# Patient Record
Sex: FEMALE | Race: WHITE | NOT HISPANIC OR LATINO
[De-identification: names, ages, dates, MRNs, and addresses within clinical notes are randomized per-mention and may not be internally consistent; named-entity substitution may affect disease eponyms.]

---

## 2018-11-07 ENCOUNTER — RESULT REVIEW (OUTPATIENT)
Age: 28
End: 2018-11-07

## 2019-11-22 ENCOUNTER — RESULT REVIEW (OUTPATIENT)
Age: 29
End: 2019-11-22

## 2020-12-13 ENCOUNTER — RESULT REVIEW (OUTPATIENT)
Age: 30
End: 2020-12-13

## 2022-05-06 ENCOUNTER — OUTPATIENT (OUTPATIENT)
Dept: OUTPATIENT SERVICES | Facility: HOSPITAL | Age: 32
LOS: 1 days | End: 2022-05-06
Payer: COMMERCIAL

## 2022-05-06 VITALS
SYSTOLIC BLOOD PRESSURE: 122 MMHG | RESPIRATION RATE: 20 BRPM | DIASTOLIC BLOOD PRESSURE: 80 MMHG | HEART RATE: 82 BPM | WEIGHT: 167.77 LBS | TEMPERATURE: 98 F | OXYGEN SATURATION: 99 % | HEIGHT: 63 IN

## 2022-05-06 DIAGNOSIS — Z29.9 ENCOUNTER FOR PROPHYLACTIC MEASURES, UNSPECIFIED: ICD-10-CM

## 2022-05-06 DIAGNOSIS — Z01.818 ENCOUNTER FOR OTHER PREPROCEDURAL EXAMINATION: ICD-10-CM

## 2022-05-06 LAB
BLD GP AB SCN SERPL QL: NEGATIVE — SIGNIFICANT CHANGE UP
HCT VFR BLD CALC: 40.2 % — SIGNIFICANT CHANGE UP (ref 34.5–45)
HGB BLD-MCNC: 13.8 G/DL — SIGNIFICANT CHANGE UP (ref 11.5–15.5)
MCHC RBC-ENTMCNC: 32.2 PG — SIGNIFICANT CHANGE UP (ref 27–34)
MCHC RBC-ENTMCNC: 34.3 GM/DL — SIGNIFICANT CHANGE UP (ref 32–36)
MCV RBC AUTO: 93.9 FL — SIGNIFICANT CHANGE UP (ref 80–100)
NRBC # BLD: 0 /100 WBCS — SIGNIFICANT CHANGE UP (ref 0–0)
PLATELET # BLD AUTO: 127 K/UL — LOW (ref 150–400)
RBC # BLD: 4.28 M/UL — SIGNIFICANT CHANGE UP (ref 3.8–5.2)
RBC # FLD: 13.2 % — SIGNIFICANT CHANGE UP (ref 10.3–14.5)
RH IG SCN BLD-IMP: POSITIVE — SIGNIFICANT CHANGE UP
WBC # BLD: 10.44 K/UL — SIGNIFICANT CHANGE UP (ref 3.8–10.5)
WBC # FLD AUTO: 10.44 K/UL — SIGNIFICANT CHANGE UP (ref 3.8–10.5)

## 2022-05-06 PROCEDURE — 85027 COMPLETE CBC AUTOMATED: CPT

## 2022-05-06 PROCEDURE — 86900 BLOOD TYPING SEROLOGIC ABO: CPT

## 2022-05-06 PROCEDURE — 86850 RBC ANTIBODY SCREEN: CPT

## 2022-05-06 PROCEDURE — 86901 BLOOD TYPING SEROLOGIC RH(D): CPT

## 2022-05-06 PROCEDURE — G0463: CPT

## 2022-05-06 RX ORDER — CEFAZOLIN SODIUM 1 G
2000 VIAL (EA) INJECTION ONCE
Refills: 0 | Status: COMPLETED | OUTPATIENT
Start: 2022-05-10 | End: 2022-05-10

## 2022-05-06 RX ORDER — FAMOTIDINE 10 MG/ML
20 INJECTION INTRAVENOUS ONCE
Refills: 0 | Status: COMPLETED | OUTPATIENT
Start: 2022-05-10 | End: 2022-05-10

## 2022-05-06 RX ORDER — SODIUM CHLORIDE 9 MG/ML
1000 INJECTION, SOLUTION INTRAVENOUS ONCE
Refills: 0 | Status: COMPLETED | OUTPATIENT
Start: 2022-05-10 | End: 2022-05-10

## 2022-05-06 RX ORDER — OXYTOCIN 10 UNIT/ML
333.33 VIAL (ML) INJECTION
Qty: 20 | Refills: 0 | Status: DISCONTINUED | OUTPATIENT
Start: 2022-05-10 | End: 2022-05-12

## 2022-05-06 RX ORDER — CITRIC ACID/SODIUM CITRATE 300-500 MG
15 SOLUTION, ORAL ORAL ONCE
Refills: 0 | Status: COMPLETED | OUTPATIENT
Start: 2022-05-10 | End: 2022-05-10

## 2022-05-06 RX ORDER — CHLORHEXIDINE GLUCONATE 213 G/1000ML
1 SOLUTION TOPICAL ONCE
Refills: 0 | Status: COMPLETED | OUTPATIENT
Start: 2022-05-10 | End: 2022-05-10

## 2022-05-06 RX ORDER — SODIUM CHLORIDE 9 MG/ML
1000 INJECTION, SOLUTION INTRAVENOUS
Refills: 0 | Status: DISCONTINUED | OUTPATIENT
Start: 2022-05-10 | End: 2022-05-10

## 2022-05-06 NOTE — OB PST NOTE - HISTORY OF PRESENT ILLNESS
31 yr old female  31 yr old female ,  , with Breech presentation, Coming in for Primary , EDC 2022    Denies Recent travel, Exposure or Covid symptoms  Covid test- 2022

## 2022-05-06 NOTE — OB PST NOTE - ASSESSMENT
PATRICEI VTE 2.0 SCORE [CLOT updated 2019]    AGE RELATED RISK FACTORS                                                       MOBILITY RELATED FACTORS  [ ] Age 41-60 years                                            (1 Point)                    [ ] Bed rest                                                        (1 Point)  [ ] Age: 61-74 years                                           (2 Points)                  [ ] Plaster cast                                                   (2 Points)  [ ] Age= 75 years                                              (3 Points)                    [ ] Bed bound for more than 72 hours                 (2 Points)    DISEASE RELATED RISK FACTORS                                               GENDER SPECIFIC FACTORS  [ ] Edema in the lower extremities                       (1 Point)              [ ] Pregnancy                                                     (1 Point)  [ ] Varicose veins                                               (1 Point)                     [ ] Post-partum < 6 weeks                                   (1 Point)             [ ] BMI > 25 Kg/m2                                            (1 Point)                     [ ] Hormonal therapy  or oral contraception          (1 Point)                 [ ] Sepsis (in the previous month)                        (1 Point)               [ ] History of pregnancy complications                 (1 point)  [ ] Pneumonia or serious lung disease                                               [ ] Unexplained or recurrent                     (1 Point)           (in the previous month)                               (1 Point)  [ ] Abnormal pulmonary function test                     (1 Point)                 SURGERY RELATED RISK FACTORS  [ ] Acute myocardial infarction                              (1 Point)               [ ]  Section                                             (1 Point)  [ ] Congestive heart failure (in the previous month)  (1 Point)      [ ] Minor surgery                                                  (1 Point)   [ ] Inflammatory bowel disease                             (1 Point)               [ ] Arthroscopic surgery                                        (2 Points)  [ ] Central venous access                                      (2 Points)                [ ] General surgery lasting more than 45 minutes (2 points)  [ ] Malignancy- Present or previous                   (2 Points)                [ ] Elective arthroplasty                                         (5 points)    [ ] Stroke (in the previous month)                          (5 Points)                                                                                                                                                           HEMATOLOGY RELATED FACTORS                                                 TRAUMA RELATED RISK FACTORS  [ ] Prior episodes of VTE                                     (3 Points)                [ ] Fracture of the hip, pelvis, or leg                       (5 Points)  [ ] Positive family history for VTE                         (3 Points)             [ ] Acute spinal cord injury (in the previous month)  (5 Points)  [ ] Prothrombin 47550 A                                     (3 Points)               [ ] Paralysis  (less than 1 month)                             (5 Points)  [ ] Factor V Leiden                                             (3 Points)                  [ ] Multiple Trauma within 1 month                        (5 Points)  [ ] Lupus anticoagulants                                     (3 Points)                                                           [ ] Anticardiolipin antibodies                               (3 Points)                                                       [ ] High homocysteine in the blood                      (3 Points)                                             [ ] Other congenital or acquired thrombophilia      (3 Points)                                                [ ] Heparin induced thrombocytopenia                  (3 Points)                                     Total Score [          ] PATRIECI VTE 2.0 SCORE [CLOT updated 2019]    AGE RELATED RISK FACTORS                                                       MOBILITY RELATED FACTORS  [ ] Age 41-60 years                                            (1 Point)                    [ ] Bed rest                                                        (1 Point)  [ ] Age: 61-74 years                                           (2 Points)                  [ ] Plaster cast                                                   (2 Points)  [ ] Age= 75 years                                              (3 Points)                    [ ] Bed bound for more than 72 hours                 (2 Points)    DISEASE RELATED RISK FACTORS                                               GENDER SPECIFIC FACTORS  [ ] Edema in the lower extremities                       (1 Point)              [x ] Pregnancy                                                     (1 Point)  [ ] Varicose veins                                               (1 Point)                     [ ] Post-partum < 6 weeks                                   (1 Point)             [x ] BMI > 25 Kg/m2                                            (1 Point)                     [ ] Hormonal therapy  or oral contraception          (1 Point)                 [ ] Sepsis (in the previous month)                        (1 Point)               [ ] History of pregnancy complications                 (1 point)  [ ] Pneumonia or serious lung disease                                               [ ] Unexplained or recurrent                     (1 Point)           (in the previous month)                               (1 Point)  [ ] Abnormal pulmonary function test                     (1 Point)                 SURGERY RELATED RISK FACTORS  [ ] Acute myocardial infarction                              (1 Point)               [x ]  Section                                             (1 Point)  [ ] Congestive heart failure (in the previous month)  (1 Point)      [ ] Minor surgery                                                  (1 Point)   [ ] Inflammatory bowel disease                             (1 Point)               [ ] Arthroscopic surgery                                        (2 Points)  [ ] Central venous access                                      (2 Points)                [ ] General surgery lasting more than 45 minutes (2 points)  [ ] Malignancy- Present or previous                   (2 Points)                [ ] Elective arthroplasty                                         (5 points)    [ ] Stroke (in the previous month)                          (5 Points)                                                                                                                                                           HEMATOLOGY RELATED FACTORS                                                 TRAUMA RELATED RISK FACTORS  [ ] Prior episodes of VTE                                     (3 Points)                [ ] Fracture of the hip, pelvis, or leg                       (5 Points)  [ ] Positive family history for VTE                         (3 Points)             [ ] Acute spinal cord injury (in the previous month)  (5 Points)  [ ] Prothrombin 74589 A                                     (3 Points)               [ ] Paralysis  (less than 1 month)                             (5 Points)  [ ] Factor V Leiden                                             (3 Points)                  [ ] Multiple Trauma within 1 month                        (5 Points)  [ ] Lupus anticoagulants                                     (3 Points)                                                           [ ] Anticardiolipin antibodies                               (3 Points)                                                       [ ] High homocysteine in the blood                      (3 Points)                                             [ ] Other congenital or acquired thrombophilia      (3 Points)                                                [ ] Heparin induced thrombocytopenia                  (3 Points)                                     Total Score [    3      ]

## 2022-05-06 NOTE — OB PST NOTE - FALL HARM RISK - UNIVERSAL INTERVENTIONS
Bed in lowest position, wheels locked, appropriate side rails in place/Call bell, personal items and telephone in reach/Instruct patient to call for assistance before getting out of bed or chair/Non-slip footwear when patient is out of bed/[place] to call system/Physically safe environment - no spills, clutter or unnecessary equipment/Purposeful Proactive Rounding/Room/bathroom lighting operational, light cord in reach

## 2022-05-08 ENCOUNTER — OUTPATIENT (OUTPATIENT)
Dept: OUTPATIENT SERVICES | Facility: HOSPITAL | Age: 32
LOS: 1 days | End: 2022-05-08
Payer: COMMERCIAL

## 2022-05-08 DIAGNOSIS — Z11.52 ENCOUNTER FOR SCREENING FOR COVID-19: ICD-10-CM

## 2022-05-08 LAB — SARS-COV-2 RNA SPEC QL NAA+PROBE: SIGNIFICANT CHANGE UP

## 2022-05-08 PROCEDURE — C9803: CPT

## 2022-05-08 PROCEDURE — U0003: CPT

## 2022-05-08 PROCEDURE — U0005: CPT

## 2022-05-09 ENCOUNTER — TRANSCRIPTION ENCOUNTER (OUTPATIENT)
Age: 32
End: 2022-05-09

## 2022-05-10 ENCOUNTER — INPATIENT (INPATIENT)
Facility: HOSPITAL | Age: 32
LOS: 1 days | Discharge: HOME | End: 2022-05-12
Attending: OBSTETRICS & GYNECOLOGY | Admitting: OBSTETRICS & GYNECOLOGY
Payer: COMMERCIAL

## 2022-05-10 VITALS — DIASTOLIC BLOOD PRESSURE: 94 MMHG | SYSTOLIC BLOOD PRESSURE: 136 MMHG | HEART RATE: 103 BPM

## 2022-05-10 LAB
BASOPHILS # BLD AUTO: 0.05 K/UL — SIGNIFICANT CHANGE UP (ref 0–0.2)
BASOPHILS NFR BLD AUTO: 0.5 % — SIGNIFICANT CHANGE UP (ref 0–2)
COVID-19 SPIKE DOMAIN AB INTERP: POSITIVE
COVID-19 SPIKE DOMAIN ANTIBODY RESULT: >250 U/ML — HIGH
EOSINOPHIL # BLD AUTO: 0.09 K/UL — SIGNIFICANT CHANGE UP (ref 0–0.5)
EOSINOPHIL NFR BLD AUTO: 0.9 % — SIGNIFICANT CHANGE UP (ref 0–6)
HCT VFR BLD CALC: 38 % — SIGNIFICANT CHANGE UP (ref 34.5–45)
HGB BLD-MCNC: 13.4 G/DL — SIGNIFICANT CHANGE UP (ref 11.5–15.5)
IMM GRANULOCYTES NFR BLD AUTO: 0.5 % — SIGNIFICANT CHANGE UP (ref 0–1.5)
LYMPHOCYTES # BLD AUTO: 2.65 K/UL — SIGNIFICANT CHANGE UP (ref 1–3.3)
LYMPHOCYTES # BLD AUTO: 26.8 % — SIGNIFICANT CHANGE UP (ref 13–44)
MCHC RBC-ENTMCNC: 32.7 PG — SIGNIFICANT CHANGE UP (ref 27–34)
MCHC RBC-ENTMCNC: 35.3 GM/DL — SIGNIFICANT CHANGE UP (ref 32–36)
MCV RBC AUTO: 92.7 FL — SIGNIFICANT CHANGE UP (ref 80–100)
MONOCYTES # BLD AUTO: 0.85 K/UL — SIGNIFICANT CHANGE UP (ref 0–0.9)
MONOCYTES NFR BLD AUTO: 8.6 % — SIGNIFICANT CHANGE UP (ref 2–14)
NEUTROPHILS # BLD AUTO: 6.19 K/UL — SIGNIFICANT CHANGE UP (ref 1.8–7.4)
NEUTROPHILS NFR BLD AUTO: 62.7 % — SIGNIFICANT CHANGE UP (ref 43–77)
NRBC # BLD: 0 /100 WBCS — SIGNIFICANT CHANGE UP (ref 0–0)
PLATELET # BLD AUTO: 110 K/UL — LOW (ref 150–400)
RBC # BLD: 4.1 M/UL — SIGNIFICANT CHANGE UP (ref 3.8–5.2)
RBC # FLD: 13.2 % — SIGNIFICANT CHANGE UP (ref 10.3–14.5)
SARS-COV-2 IGG+IGM SERPL QL IA: >250 U/ML — HIGH
SARS-COV-2 IGG+IGM SERPL QL IA: POSITIVE
T PALLIDUM AB TITR SER: NEGATIVE — SIGNIFICANT CHANGE UP
WBC # BLD: 9.88 K/UL — SIGNIFICANT CHANGE UP (ref 3.8–10.5)
WBC # FLD AUTO: 9.88 K/UL — SIGNIFICANT CHANGE UP (ref 3.8–10.5)

## 2022-05-10 DEVICE — INTERCEED 3 X 4": Type: IMPLANTABLE DEVICE | Status: FUNCTIONAL

## 2022-05-10 RX ORDER — NALBUPHINE HYDROCHLORIDE 10 MG/ML
2.5 INJECTION, SOLUTION INTRAMUSCULAR; INTRAVENOUS; SUBCUTANEOUS EVERY 6 HOURS
Refills: 0 | Status: DISCONTINUED | OUTPATIENT
Start: 2022-05-10 | End: 2022-05-11

## 2022-05-10 RX ORDER — KETOROLAC TROMETHAMINE 30 MG/ML
30 SYRINGE (ML) INJECTION EVERY 6 HOURS
Refills: 0 | Status: DISCONTINUED | OUTPATIENT
Start: 2022-05-10 | End: 2022-05-11

## 2022-05-10 RX ORDER — HEPARIN SODIUM 5000 [USP'U]/ML
5000 INJECTION INTRAVENOUS; SUBCUTANEOUS EVERY 12 HOURS
Refills: 0 | Status: DISCONTINUED | OUTPATIENT
Start: 2022-05-10 | End: 2022-05-12

## 2022-05-10 RX ORDER — ACETAMINOPHEN 500 MG
975 TABLET ORAL
Refills: 0 | Status: DISCONTINUED | OUTPATIENT
Start: 2022-05-10 | End: 2022-05-12

## 2022-05-10 RX ORDER — OXYTOCIN 10 UNIT/ML
333.33 VIAL (ML) INJECTION
Qty: 20 | Refills: 0 | Status: DISCONTINUED | OUTPATIENT
Start: 2022-05-10 | End: 2022-05-12

## 2022-05-10 RX ORDER — OXYCODONE HYDROCHLORIDE 5 MG/1
5 TABLET ORAL ONCE
Refills: 0 | Status: DISCONTINUED | OUTPATIENT
Start: 2022-05-10 | End: 2022-05-11

## 2022-05-10 RX ORDER — ONDANSETRON 8 MG/1
4 TABLET, FILM COATED ORAL EVERY 6 HOURS
Refills: 0 | Status: DISCONTINUED | OUTPATIENT
Start: 2022-05-10 | End: 2022-05-11

## 2022-05-10 RX ORDER — TETANUS TOXOID, REDUCED DIPHTHERIA TOXOID AND ACELLULAR PERTUSSIS VACCINE, ADSORBED 5; 2.5; 8; 8; 2.5 [IU]/.5ML; [IU]/.5ML; UG/.5ML; UG/.5ML; UG/.5ML
0.5 SUSPENSION INTRAMUSCULAR ONCE
Refills: 0 | Status: DISCONTINUED | OUTPATIENT
Start: 2022-05-10 | End: 2022-05-12

## 2022-05-10 RX ORDER — OXYCODONE HYDROCHLORIDE 5 MG/1
5 TABLET ORAL
Refills: 0 | Status: DISCONTINUED | OUTPATIENT
Start: 2022-05-10 | End: 2022-05-12

## 2022-05-10 RX ORDER — METOCLOPRAMIDE HCL 10 MG
10 TABLET ORAL ONCE
Refills: 0 | Status: COMPLETED | OUTPATIENT
Start: 2022-05-10 | End: 2022-05-10

## 2022-05-10 RX ORDER — DIPHENHYDRAMINE HCL 50 MG
25 CAPSULE ORAL EVERY 6 HOURS
Refills: 0 | Status: DISCONTINUED | OUTPATIENT
Start: 2022-05-10 | End: 2022-05-12

## 2022-05-10 RX ORDER — MORPHINE SULFATE 50 MG/1
0.1 CAPSULE, EXTENDED RELEASE ORAL ONCE
Refills: 0 | Status: DISCONTINUED | OUTPATIENT
Start: 2022-05-10 | End: 2022-05-11

## 2022-05-10 RX ORDER — SIMETHICONE 80 MG/1
80 TABLET, CHEWABLE ORAL EVERY 4 HOURS
Refills: 0 | Status: DISCONTINUED | OUTPATIENT
Start: 2022-05-10 | End: 2022-05-12

## 2022-05-10 RX ORDER — ACETAMINOPHEN 500 MG
975 TABLET ORAL
Refills: 0 | Status: DISCONTINUED | OUTPATIENT
Start: 2022-05-10 | End: 2022-05-11

## 2022-05-10 RX ORDER — NALOXONE HYDROCHLORIDE 4 MG/.1ML
0.1 SPRAY NASAL
Refills: 0 | Status: DISCONTINUED | OUTPATIENT
Start: 2022-05-10 | End: 2022-05-11

## 2022-05-10 RX ORDER — DEXAMETHASONE 0.5 MG/5ML
4 ELIXIR ORAL EVERY 6 HOURS
Refills: 0 | Status: DISCONTINUED | OUTPATIENT
Start: 2022-05-10 | End: 2022-05-11

## 2022-05-10 RX ORDER — OXYCODONE HYDROCHLORIDE 5 MG/1
5 TABLET ORAL ONCE
Refills: 0 | Status: DISCONTINUED | OUTPATIENT
Start: 2022-05-10 | End: 2022-05-12

## 2022-05-10 RX ORDER — SODIUM CHLORIDE 9 MG/ML
1000 INJECTION, SOLUTION INTRAVENOUS
Refills: 0 | Status: DISCONTINUED | OUTPATIENT
Start: 2022-05-10 | End: 2022-05-12

## 2022-05-10 RX ORDER — MAGNESIUM HYDROXIDE 400 MG/1
30 TABLET, CHEWABLE ORAL
Refills: 0 | Status: DISCONTINUED | OUTPATIENT
Start: 2022-05-10 | End: 2022-05-12

## 2022-05-10 RX ORDER — IBUPROFEN 200 MG
600 TABLET ORAL EVERY 6 HOURS
Refills: 0 | Status: COMPLETED | OUTPATIENT
Start: 2022-05-10 | End: 2023-04-08

## 2022-05-10 RX ORDER — LANOLIN
1 OINTMENT (GRAM) TOPICAL EVERY 6 HOURS
Refills: 0 | Status: DISCONTINUED | OUTPATIENT
Start: 2022-05-10 | End: 2022-05-12

## 2022-05-10 RX ORDER — OXYCODONE HYDROCHLORIDE 5 MG/1
5 TABLET ORAL
Refills: 0 | Status: DISCONTINUED | OUTPATIENT
Start: 2022-05-10 | End: 2022-05-11

## 2022-05-10 RX ORDER — IBUPROFEN 200 MG
600 TABLET ORAL EVERY 6 HOURS
Refills: 0 | Status: DISCONTINUED | OUTPATIENT
Start: 2022-05-10 | End: 2022-05-11

## 2022-05-10 RX ORDER — OXYCODONE HYDROCHLORIDE 5 MG/1
5 TABLET ORAL
Refills: 0 | Status: DISCONTINUED | OUTPATIENT
Start: 2022-05-10 | End: 2022-05-10

## 2022-05-10 RX ADMIN — Medication 30 MILLIGRAM(S): at 18:19

## 2022-05-10 RX ADMIN — Medication 30 MILLIGRAM(S): at 09:48

## 2022-05-10 RX ADMIN — CHLORHEXIDINE GLUCONATE 1 APPLICATION(S): 213 SOLUTION TOPICAL at 05:30

## 2022-05-10 RX ADMIN — Medication 4 MILLIGRAM(S): at 08:47

## 2022-05-10 RX ADMIN — ONDANSETRON 4 MILLIGRAM(S): 8 TABLET, FILM COATED ORAL at 08:47

## 2022-05-10 RX ADMIN — Medication 10 MILLIGRAM(S): at 11:12

## 2022-05-10 RX ADMIN — Medication 975 MILLIGRAM(S): at 21:38

## 2022-05-10 RX ADMIN — Medication 975 MILLIGRAM(S): at 15:22

## 2022-05-10 RX ADMIN — Medication 975 MILLIGRAM(S): at 15:52

## 2022-05-10 RX ADMIN — Medication 100 MILLIGRAM(S): at 08:45

## 2022-05-10 RX ADMIN — Medication 1000 MILLIUNIT(S)/MIN: at 10:24

## 2022-05-10 RX ADMIN — SODIUM CHLORIDE 125 MILLILITER(S): 9 INJECTION, SOLUTION INTRAVENOUS at 10:30

## 2022-05-10 RX ADMIN — Medication 975 MILLIGRAM(S): at 21:08

## 2022-05-10 RX ADMIN — ONDANSETRON 4 MILLIGRAM(S): 8 TABLET, FILM COATED ORAL at 18:33

## 2022-05-10 RX ADMIN — Medication 15 MILLILITER(S): at 08:02

## 2022-05-10 RX ADMIN — Medication 30 MILLIGRAM(S): at 23:47

## 2022-05-10 RX ADMIN — Medication 1000 MILLIUNIT(S)/MIN: at 10:25

## 2022-05-10 RX ADMIN — SODIUM CHLORIDE 2000 MILLILITER(S): 9 INJECTION, SOLUTION INTRAVENOUS at 06:15

## 2022-05-10 RX ADMIN — FAMOTIDINE 20 MILLIGRAM(S): 10 INJECTION INTRAVENOUS at 08:02

## 2022-05-10 RX ADMIN — Medication 30 MILLIGRAM(S): at 17:49

## 2022-05-10 RX ADMIN — HEPARIN SODIUM 5000 UNIT(S): 5000 INJECTION INTRAVENOUS; SUBCUTANEOUS at 17:50

## 2022-05-10 NOTE — OB PROVIDER H&P - ATTENDING COMMENTS
30 yo nullip at term for prim C/S sec to breech.  Uncompl APC, no sig PMHx.  R/B ECV were reviewed and pt declined, rec prim C/S over , pros/cons reviewed.  Bedside sono today confirms breech.    LUCIO Nguyen

## 2022-05-10 NOTE — OB RN DELIVERY SUMMARY - NSSELHIDDEN_OBGYN_ALL_OB_FT
[NS_DeliveryAttending1_OBGYN_ALL_OB_FT:[id number]],[NS_DeliveryRN_OBGYN_ALL_OB_FT:[id number]] [NS_DeliveryAttending1_OBGYN_ALL_OB_FT:[id number]=],[NS_DeliveryRN_OBGYN_ALL_OB_FT:[id number]],[NS_DeliveryAssist1_OBGYN_ALL_OB_FT:[id number]=]

## 2022-05-10 NOTE — OB PROVIDER H&P - ASSESSMENT
Assessment  31y  at 39w presents for scheduled section for breech presentation.     Plan  1. Admit to L+D for scheduled . Routine Labs. IVF.  2. Breech presentation.    Plan per attending physician, Dr. Patrick So MD  PGY1

## 2022-05-10 NOTE — OB PROVIDER DELIVERY SUMMARY - NSPROVIDERDELIVERYNOTE_OBGYN_ALL_OB_FT
pLTCS at 39w for breech position    Viable male infant delivered breech presentation, xxxx g, APGARS 9/9    Grossly normal uterus, bilateral fallopian tubes, ovaries  Interceed placed    321/1700/200 pLTCS at 39w for breech position, uncomplicated C/S    Viable male infant, breech presentation, 3260g, APGARS 9/9    Hysterotomy closed in one layer, interceed placed    Grossly normal uterus, bilateral fallopian tubes, ovaries    321/1700/200

## 2022-05-10 NOTE — OB RN PATIENT PROFILE - NS_DATEOFLASTVISIT_OBGYN_ALL_OB_DT
Labor Progress Note - Tom Herrera 34 y o  female MRN: [id number]    Unit/Bed#: L&D 325-01 Encounter: [id number]    Obstetric History       T2      L2     SAB0   TAB0   Ectopic0   Multiple0   Live Births2      Gestational Age: 39w6d     Contraction Frequency (minutes): 4-5  Contraction Quality: Mild  Tachysystole: No   Dilation: 6        Effacement (%): 100  Station: -2  Baseline Rate: 125 bpm     FHR Category: Category I          Notes/comments:      Pt comfortable s/p epidural  Fetal head slightly ballotable, will defer rupture to next check to ensure safety  Dr Prince Man contacted via [facility] Text with update          Kasey Luu DO 10/2/2018 5:03 AM 03-May-2022

## 2022-05-10 NOTE — OB RN INTRAOPERATIVE NOTE - NSSELHIDDEN_OBGYN_ALL_OB_FT
[NS_DeliveryAttending1_OBGYN_ALL_OB_FT:[id number]=],[NS_DeliveryRN_OBGYN_ALL_OB_FT:[id number]==]

## 2022-05-10 NOTE — OB NEONATOLOGY/PEDIATRICIAN DELIVERY SUMMARY - NSPEDSNEONOTESA_OBGYN_ALL_OB_FT
This is a 39 week boy born to a 31 year old  mother via primary C/S. No singificant maternal or prenatal history. Blood type is B+. HIV, HebB negative. RPR non-reactive. Rubella non-immune. GBS negative from . No rupture or labor prior to delivery. Baby emerged breach, vigorous, and cyring. WDSS. APGARS 9/9. Breast feeding. Wants Hep B. Wants Circ. COVId negative.

## 2022-05-10 NOTE — OB RN INTRAOPERATIVE NOTE - NS_SKINPREP_OBGYN_ALL_OB
Detail Level: Simple
Additional Notes: Patient consent was obtained to proceed with the visit and recommended plan of care after discussion of all risks and benefits, including the risks of COVID-19 exposure.
Detail Level: Detailed
Render Risk Assessment In Note?: no
Additional Notes: Treated via LN2 today, recheck lesion at next FBE with LRS
Chloraprep

## 2022-05-10 NOTE — OB PROVIDER H&P - HISTORY OF PRESENT ILLNESS
31 yr old female ,  , with Breech presentation, Coming in for Primary , EDC 2022    Denies Recent travel, Exposure or Covid symptoms  Covid test- 2022    R1 Admission H&P    Subjective  HPI: 31y  at 39w presents for scheduled section for breech presentation. +FM. -LOF. -CTXs. -VB. Pt denies any other concerns.    – PNC: Denies prenatal issues. EFW 5# 5 wks prior  – OBHx: denies  – GynHx: denies  – PMH: denies  – PSH: denies  – Psych: denies   – Social: denies   – Meds: PNV   – Allergies: NKDA  – Will accept blood transfusions? Yes    Objective  – VS  T(C): 36.6 (05-10-22 @ 07:27)  HR: 88 (05-10-22 @ 07:27)  BP: 129/88 (05-10-22 @ 07:27)  RR: 18 (05-10-22 @ 07:27)  SpO2: --    Physical Exam  CV: RRR  Pulm: breathing comfortably on RA  Abd: gravid, nontender  Extr: moving all extremities with ease    – EFW: 5# 5 wks prior  – Sono: breech

## 2022-05-10 NOTE — OB RN PATIENT PROFILE - GRAVIDA, OB PROFILE
See attached media 8/27/18    Received fax from pharmacy regarding Rx for Pseudoephedrine-APAP-DM -15 MG CAPS. Placed call to pharmacy. According to pharmacist, medication is no longer manufactured anymore. Please send alternative to pharmacy. Please advise. 1

## 2022-05-10 NOTE — OB PROVIDER DELIVERY SUMMARY - NSSELHIDDEN_OBGYN_ALL_OB_FT
[NS_DeliveryAttending1_OBGYN_ALL_OB_FT:[id number]=],[NS_DeliveryRN_OBGYN_ALL_OB_FT:[id number]] [NS_DeliveryAttending1_OBGYN_ALL_OB_FT:[id number]=],[NS_DeliveryRN_OBGYN_ALL_OB_FT:[id number]],[NS_DeliveryAssist1_OBGYN_ALL_OB_FT:[id number]=]

## 2022-05-10 NOTE — OB RN DELIVERY SUMMARY - NS_SEPSISRSKCALC_OBGYN_ALL_OB_FT
EOS calculated successfully. EOS Risk Factor: 0.5/1000 live births ([facility] national incidence); GA=39w;Temp=97.9; ROM=0; GBS='Negative'; Antibiotics='No antibiotics or any antibiotics < 2 hrs prior to birth'

## 2022-05-11 DIAGNOSIS — D62 ACUTE POSTHEMORRHAGIC ANEMIA: ICD-10-CM

## 2022-05-11 LAB
BASOPHILS # BLD AUTO: 0.03 K/UL — SIGNIFICANT CHANGE UP (ref 0–0.2)
BASOPHILS NFR BLD AUTO: 0.2 % — SIGNIFICANT CHANGE UP (ref 0–2)
EOSINOPHIL # BLD AUTO: 0 K/UL — SIGNIFICANT CHANGE UP (ref 0–0.5)
EOSINOPHIL NFR BLD AUTO: 0 % — SIGNIFICANT CHANGE UP (ref 0–6)
HCT VFR BLD CALC: 24.2 % — LOW (ref 34.5–45)
HGB BLD-MCNC: 8.6 G/DL — LOW (ref 11.5–15.5)
IMM GRANULOCYTES NFR BLD AUTO: 0.7 % — SIGNIFICANT CHANGE UP (ref 0–1.5)
LYMPHOCYTES # BLD AUTO: 14.5 % — SIGNIFICANT CHANGE UP (ref 13–44)
LYMPHOCYTES # BLD AUTO: 2.8 K/UL — SIGNIFICANT CHANGE UP (ref 1–3.3)
MCHC RBC-ENTMCNC: 33.6 PG — SIGNIFICANT CHANGE UP (ref 27–34)
MCHC RBC-ENTMCNC: 35.5 GM/DL — SIGNIFICANT CHANGE UP (ref 32–36)
MCV RBC AUTO: 94.5 FL — SIGNIFICANT CHANGE UP (ref 80–100)
MONOCYTES # BLD AUTO: 1.36 K/UL — HIGH (ref 0–0.9)
MONOCYTES NFR BLD AUTO: 7 % — SIGNIFICANT CHANGE UP (ref 2–14)
NEUTROPHILS # BLD AUTO: 14.98 K/UL — HIGH (ref 1.8–7.4)
NEUTROPHILS NFR BLD AUTO: 77.6 % — HIGH (ref 43–77)
NRBC # BLD: 0 /100 WBCS — SIGNIFICANT CHANGE UP (ref 0–0)
PLATELET # BLD AUTO: 132 K/UL — LOW (ref 150–400)
RBC # BLD: 2.56 M/UL — LOW (ref 3.8–5.2)
RBC # FLD: 13 % — SIGNIFICANT CHANGE UP (ref 10.3–14.5)
WBC # BLD: 19.3 K/UL — HIGH (ref 3.8–10.5)
WBC # FLD AUTO: 19.3 K/UL — HIGH (ref 3.8–10.5)

## 2022-05-11 RX ORDER — IBUPROFEN 200 MG
600 TABLET ORAL EVERY 6 HOURS
Refills: 0 | Status: DISCONTINUED | OUTPATIENT
Start: 2022-05-11 | End: 2022-05-12

## 2022-05-11 RX ORDER — ASCORBIC ACID 60 MG
500 TABLET,CHEWABLE ORAL THREE TIMES A DAY
Refills: 0 | Status: DISCONTINUED | OUTPATIENT
Start: 2022-05-11 | End: 2022-05-12

## 2022-05-11 RX ORDER — FERROUS SULFATE 325(65) MG
325 TABLET ORAL THREE TIMES A DAY
Refills: 0 | Status: DISCONTINUED | OUTPATIENT
Start: 2022-05-11 | End: 2022-05-12

## 2022-05-11 RX ADMIN — Medication 600 MILLIGRAM(S): at 12:13

## 2022-05-11 RX ADMIN — Medication 975 MILLIGRAM(S): at 16:05

## 2022-05-11 RX ADMIN — HEPARIN SODIUM 5000 UNIT(S): 5000 INJECTION INTRAVENOUS; SUBCUTANEOUS at 05:33

## 2022-05-11 RX ADMIN — Medication 600 MILLIGRAM(S): at 17:51

## 2022-05-11 RX ADMIN — HEPARIN SODIUM 5000 UNIT(S): 5000 INJECTION INTRAVENOUS; SUBCUTANEOUS at 17:51

## 2022-05-11 RX ADMIN — Medication 600 MILLIGRAM(S): at 18:36

## 2022-05-11 RX ADMIN — Medication 30 MILLIGRAM(S): at 00:17

## 2022-05-11 RX ADMIN — Medication 975 MILLIGRAM(S): at 21:16

## 2022-05-11 RX ADMIN — Medication 325 MILLIGRAM(S): at 15:34

## 2022-05-11 RX ADMIN — Medication 975 MILLIGRAM(S): at 02:13

## 2022-05-11 RX ADMIN — Medication 325 MILLIGRAM(S): at 21:15

## 2022-05-11 RX ADMIN — Medication 975 MILLIGRAM(S): at 09:55

## 2022-05-11 RX ADMIN — Medication 975 MILLIGRAM(S): at 09:25

## 2022-05-11 RX ADMIN — Medication 500 MILLIGRAM(S): at 15:34

## 2022-05-11 RX ADMIN — Medication 500 MILLIGRAM(S): at 21:15

## 2022-05-11 RX ADMIN — Medication 30 MILLIGRAM(S): at 05:34

## 2022-05-11 RX ADMIN — Medication 975 MILLIGRAM(S): at 15:35

## 2022-05-11 RX ADMIN — Medication 600 MILLIGRAM(S): at 12:43

## 2022-05-11 RX ADMIN — Medication 600 MILLIGRAM(S): at 23:48

## 2022-05-11 NOTE — PROGRESS NOTE ADULT - PROBLEM SELECTOR PLAN 1
Increase OOB  PO pain protocol  Regular diet  Continue routine prenatal care  Rubella nonimmune- MMR ordered

## 2022-05-11 NOTE — PROGRESS NOTE ADULT - PROBLEM SELECTOR PLAN 2
ferrous sulfate/vitamin C ordered  monitor for s/s of anemia  consider repeat CBC this afternoon vs in AM

## 2022-05-12 ENCOUNTER — TRANSCRIPTION ENCOUNTER (OUTPATIENT)
Age: 32
End: 2022-05-12

## 2022-05-12 VITALS
OXYGEN SATURATION: 97 % | TEMPERATURE: 99 F | RESPIRATION RATE: 18 BRPM | DIASTOLIC BLOOD PRESSURE: 79 MMHG | SYSTOLIC BLOOD PRESSURE: 123 MMHG | HEART RATE: 97 BPM

## 2022-05-12 PROCEDURE — 90707 MMR VACCINE SC: CPT

## 2022-05-12 PROCEDURE — 59050 FETAL MONITOR W/REPORT: CPT

## 2022-05-12 PROCEDURE — 86780 TREPONEMA PALLIDUM: CPT

## 2022-05-12 PROCEDURE — 59025 FETAL NON-STRESS TEST: CPT

## 2022-05-12 PROCEDURE — 85025 COMPLETE CBC W/AUTO DIFF WBC: CPT

## 2022-05-12 PROCEDURE — C1765: CPT

## 2022-05-12 PROCEDURE — 86901 BLOOD TYPING SEROLOGIC RH(D): CPT

## 2022-05-12 PROCEDURE — 86900 BLOOD TYPING SEROLOGIC ABO: CPT

## 2022-05-12 PROCEDURE — 86769 SARS-COV-2 COVID-19 ANTIBODY: CPT

## 2022-05-12 PROCEDURE — 86850 RBC ANTIBODY SCREEN: CPT

## 2022-05-12 PROCEDURE — 36415 COLL VENOUS BLD VENIPUNCTURE: CPT

## 2022-05-12 RX ORDER — IBUPROFEN 200 MG
1 TABLET ORAL
Qty: 0 | Refills: 0 | DISCHARGE
Start: 2022-05-12

## 2022-05-12 RX ADMIN — Medication 325 MILLIGRAM(S): at 05:14

## 2022-05-12 RX ADMIN — Medication 500 MILLIGRAM(S): at 05:14

## 2022-05-12 RX ADMIN — HEPARIN SODIUM 5000 UNIT(S): 5000 INJECTION INTRAVENOUS; SUBCUTANEOUS at 05:14

## 2022-05-12 RX ADMIN — Medication 0.5 MILLILITER(S): at 08:13

## 2022-05-12 RX ADMIN — Medication 600 MILLIGRAM(S): at 05:14

## 2022-05-12 RX ADMIN — Medication 975 MILLIGRAM(S): at 09:05

## 2022-05-12 RX ADMIN — Medication 975 MILLIGRAM(S): at 08:15

## 2022-05-12 RX ADMIN — Medication 975 MILLIGRAM(S): at 02:43

## 2022-05-12 RX ADMIN — Medication 600 MILLIGRAM(S): at 12:12

## 2022-05-12 NOTE — PROGRESS NOTE ADULT - ASSESSMENT
A/P:  31y  POD # 1 S/P   section, doing well        
A/P:  31y  POD # 2 S/P primary  section, doing well    PMHx:  Current Issues:  Anemia for acute blood loss: Continue ferrous sulfate/Vit C  Increase OOB  PO Pain Protocol  Continue Regular Diet  Continue Routine Postop/Postpartum Care

## 2022-05-12 NOTE — DISCHARGE NOTE OB - PATIENT PORTAL LINK FT
You can access the FollowMyHealth Patient Portal offered by [facility] by registering at the following website: [email or web address]. By joining "[employer]’s FollowMyHealth portal, you will also be able to view your health information using other applications (apps) compatible with our system.

## 2022-05-12 NOTE — DISCHARGE NOTE OB - MEDICATION SUMMARY - MEDICATIONS TO TAKE
I will START or STAY ON the medications listed below when I get home from the hospital:    ibuprofen 600 mg oral tablet  -- 1 tab(s) by mouth every 6 hours  -- Indication: For  delivery delivered

## 2022-05-12 NOTE — DISCHARGE NOTE OB - NS MD DC FALL RISK RISK
For information on Fall & Injury Prevention, visit: [email or web address] OR  [email or web address] OR  https://www.cdc.gov/steadi/patient.html

## 2022-05-12 NOTE — PROGRESS NOTE ADULT - SUBJECTIVE AND OBJECTIVE BOX
POST OP DAY  1  s/p   SECTION    SUBJECTIVE:  I'm ok.    PAIN SCALE SCORE: [x] Refer to charted pain scores    THERAPY:  [ x ] Spinal morphine   [  ] Epidural morphine   [  ] IV PCA Hydromorphone 1 mg/ml    OBJECTIVE:  Comfortable Appearing    SEDATION SCORE:	  [ x ] Alert	    [  ] Drowsy        [  ] Arousable	[  ] Asleep	[  ] Unresponsive    Side Effects:	  [ x ] None	     [  ] Nausea        [  ] Pruritus        [  ] Weakness   [  ] Numbness        ASSESSMENT/ PLAN   [   ] Discontinue         [  ] Continue    [ x ] Change to prn Analgesics as per primary service.    DOCUMENTATION & VERIFICATION OF CURRENT MEDS [ x ] Done    COMMENTS: No Headache.  
Day 1 of Anesthesia Pain Management Service    SUBJECTIVE: Doing ok  Pain Scale Score:          [X] Refer to charted pain scores    THERAPY:    s/p    100 mcg PF morphine on 5\10\2022      MEDICATIONS  (STANDING):  acetaminophen     Tablet .. 975 milliGRAM(s) Oral <User Schedule>  ascorbic acid 500 milliGRAM(s) Oral three times a day  diphtheria/tetanus/pertussis (acellular) Vaccine (ADAcel) 0.5 milliLiter(s) IntraMuscular once  ferrous    sulfate 325 milliGRAM(s) Oral three times a day  heparin   Injectable 5000 Unit(s) SubCutaneous every 12 hours  ibuprofen  Tablet. 600 milliGRAM(s) Oral every 6 hours  lactated ringers. 1000 milliLiter(s) (75 mL/Hr) IV Continuous <Continuous>  measles/mumps/rubella Vaccine 0.5 milliLiter(s) SubCutaneous once  morphine PF Spinal 0.1 milliGRAM(s) IntraThecal. once  oxytocin Infusion 333.333 milliUNIT(s)/Min (1000 mL/Hr) IV Continuous <Continuous>  oxytocin Infusion 333.333 milliUNIT(s)/Min (1000 mL/Hr) IV Continuous <Continuous>    MEDICATIONS  (PRN):  dexAMETHasone  Injectable 4 milliGRAM(s) IV Push every 6 hours PRN Nausea  diphenhydrAMINE 25 milliGRAM(s) Oral every 6 hours PRN Pruritus  lanolin Ointment 1 Application(s) Topical every 6 hours PRN Sore Nipples  magnesium hydroxide Suspension 30 milliLiter(s) Oral two times a day PRN Constipation  nalbuphine Injectable 2.5 milliGRAM(s) IV Push every 6 hours PRN Pruritus  naloxone Injectable 0.1 milliGRAM(s) IV Push every 3 minutes PRN For ANY of the following changes in patient status:  A. Breaths Per Minute LESS THAN 10, B. Oxygen saturation LESS THAN 90%, C. Sedation score of 6 for Stop After: 4 Times  ondansetron Injectable 4 milliGRAM(s) IV Push every 6 hours PRN Nausea  oxyCODONE    IR 5 milliGRAM(s) Oral every 3 hours PRN Mild Pain (1 - 3)  oxyCODONE    IR 5 milliGRAM(s) Oral every 3 hours PRN Moderate to Severe Pain (4-10)  oxyCODONE    IR 5 milliGRAM(s) Oral once PRN Moderate to Severe Pain (4-10)  simethicone 80 milliGRAM(s) Chew every 4 hours PRN Gas      OBJECTIVE:    Sedation:        	[X] Alert	 [ ] Drowsy	[ ] Arousable      [ ] Asleep       [ ] Unresponsive    Side Effects:	[X] None 	[ ] Nausea	[ ] Vomiting         [ ] Pruritus  		[ ] Weakness            [ ] Numbness	          [ ] Other:    Vital Signs Last 24 Hrs  T(C): 36.9 (11 May 2022 05:17), Max: 37.1 (11 May 2022 00:28)  T(F): 98.4 (11 May 2022 05:17), Max: 98.7 (11 May 2022 00:28)  HR: 84 (11 May 2022 05:17) (66 - 84)  BP: 112/76 (11 May 2022 05:17) (98/67 - 127/80)  BP(mean): 85 (10 May 2022 10:50) (85 - 92)  RR: 18 (11 May 2022 05:17) (18 - 18)  SpO2: 98% (11 May 2022 05:17) (97% - 100%)    ASSESSMENT/ PLAN  [X] Patient transitioned to prn analgesics  [X] Pain management per primary service, pain service to sign off   [X]Documentation and Verification of current medications
Postpartum Note-  Section POD#1    Allergies: No Known Allergies    Blood Type  B  --  Positive  Rubella non- immune  RPR Negative    Patient w/o complaints, pain is controlled. Denies dizziness, SOB, palpitations or CP Pt is OOB, tolerating PO, passing flatus and voiding freely. Lochia WNL.     O:  Vital Signs Last 24 Hrs  T(C): 36.9 (11 May 2022 05:17), Max: 37.1 (11 May 2022 00:28)  T(F): 98.4 (11 May 2022 05:17), Max: 98.7 (11 May 2022 00:28)  HR: 84 (11 May 2022 05:17) (66 - 103)  BP: 112/76 (11 May 2022 05:17) (98/67 - 136/94)  BP(mean): 85 (10 May 2022 10:50) (85 - 92)  RR: 18 (11 May 2022 05:17) (18 - 18)  SpO2: 98% (11 May 2022 05:17) (97% - 100%)  I&O's Summary    10 May 2022 07:01  -  11 May 2022 07:00  --------------------------------------------------------  IN: 2700 mL / OUT: 2750 mL / NET: -50 mL        Gen: NAD  Heart: S1S2 RRR  Lungs: CTA b/l  Abdomen: Soft, nontender, non-distended, fundus firm.  Incision: Clean, dry, and intact.  Negative erythema/edema/ecchymosis   Sub Q/Staples  Lochia WNL  Ext: PAS in place. Negative Homans B/L    LABS:             8.6    19.30 )-----------( 132      (  @ 05:59 )             24.2                13.4   9.88  )-----------( 110      ( 05-10 @ 07:23 )             38.0       PAST MEDICAL & SURGICAL HISTORY:  No pertinent past medical history      No significant past surgical history        Current Issues: acute blood loss anemia secondary to operative delivery - asymptomatic - does not require blood transfusion              
Postpartum Note-  Section POD#2      Rubella: Immune                 RPR:   negative                    Blood Type: B positive    S:Patient is a  31y P1 now POD#2 s/p pLTCS for malpresentation   Subjective: Patient w/o complaints, pain is controlled.  Pt is OOB, tolerating PO, passing flatus, and voiding. Lochia WNL.   Feeding: breastfeeding     O:  Vital Signs Last 24 Hrs  T(C): 37.2 (12 May 2022 05:04), Max: 37.2 (12 May 2022 05:04)  T(F): 98.9 (12 May 2022 05:04), Max: 98.9 (12 May 2022 05:04)  HR: 97 (12 May 2022 05:04) (84 - 97)  BP: 123/79 (12 May 2022 05:04) (115/72 - 124/82)  BP(mean): --  RR: 18 (12 May 2022 05:04) (18 - 18)  SpO2: 97% (12 May 2022 05:04) (97% - 100%)      Gen: NAD  CV: rrr s1s2, CTABL  Abdomen: Soft, nontender, non distened, fundus firm.  Bowel Sounds x 4 quadrants  Incision: Clean, dry, and intact.  Negative erythema/edema/ecchymosis.   SubQ  Lochia WNL  Ext: Neg edema, Neg calf tenderness.  Pedal pulses palpated B/L    LABS:                          8.6    19.30 )-----------( 132      ( 11 May 2022 05:59 )             24.2

## 2022-05-12 NOTE — DISCHARGE NOTE OB - CARE PLAN
1 Principal Discharge DX:	 delivery delivered  Assessment and plan of treatment:	tylenol 2 extra strengh alt with 3 advil  reg diet

## 2022-05-12 NOTE — DISCHARGE NOTE OB - CARE PROVIDER_API CALL
Saulo Segura (MD)  Obstetrics and Gynecology  [address], [address]  [place] [id number]  Phone: [phone]  Fax: [phone]  Follow Up Time:

## 2023-07-06 NOTE — OB RN PATIENT PROFILE - HIV: DATE, OB PROFILE
Patient: Renee Koroma Date: 2023   : 1934 Attending: Chaka Scherer MD   89 year old female      [facility]    PROCEDURE ASSESSMENT   (LOCAL ANESTHESIA - Not for use with Conscious Sedation)    Preop Diagnosis / Indications for Procedure: Need for hemodialysis    Planned Procedure: Permanent dialysis catheter exchange    Planned Anesthetic:  local      MEDICAL HISTORY / COMORBID CONDITIONS:  Significant medical / surgical history: yes     Normal mental status: Abnormal. Does not have capacity      EXAMINATION PERTINENT TO PROCEDURE BEING PERFORMED  Evaluation of operative site WNL      OTHER FINDINGS  Reviewed current medications and allergies yes      PERTINENT LAB / DIAGNOSTIC TESTS  NONE      INFORMED CONSENT  Consent obtained: yes from POA    Risks / benefits, complications, and alternatives explained, questions answered and patient / personal representative agrees to:  procedure listed above and anesthetic listed above  
31-Dec-2021

## 2023-11-02 NOTE — OB RN PATIENT PROFILE - PRO HBSAG INFANT
PAST MEDICAL HISTORY:  Arrhythmia per pt, she has episodes of tachyarrhythmia, frequently brought on by stress    Asthma     Depression     GERD (gastroesophageal reflux disease)     Hashimoto's disease     Hyperlipidemia     Hypothyroidism     Latex allergy     LBBB (left bundle branch block)     Liver mass followed annually with imaging    Lung nodule annual CT scan - last summer 2016    MVP (mitral valve prolapse)     Osteoarthrosis     Renal calculi     Right knee pain     S/P total knee arthroplasty      PAST MEDICAL HISTORY:  Arrhythmia per pt, she has episodes of tachyarrhythmia, frequently brought on by stress    Asthma     Depression     GERD (gastroesophageal reflux disease)     Hashimoto's disease     Hyperlipidemia     Hypothyroidism     Latex allergy     LBBB (left bundle branch block)     Liver mass followed annually with imaging    Lung nodule annual CT scan - last summer 2016    MVP (mitral valve prolapse)     Osteoarthrosis     Right knee pain     S/P total knee arthroplasty      negative

## 2024-04-11 NOTE — OB PST NOTE - PRO INTERPRETER NEED 2
----- Message from Agnes Hawkins sent at 4/8/2024 12:14 PM EDT -----  Subject: Appointment Request    Reason for Call: Established Patient Appointment needed: Routine Existing   Condition Follow Up    QUESTIONS    Reason for appointment request? No appointments available during search     Additional Information for Provider? Please call patent to schedule   appointment for labs and provider visit. She is currently experiencing   night sweating, weight gain and fatigue. She ha only 1/2 a thyroid and   wats labs checked.   ---------------------------------------------------------------------------  --------------  CALL BACK INFO  [phone]; OK to leave message on voicemail  ---------------------------------------------------------------------------  --------------  SCRIPT ANSWERS  
----- Message from Agnes Ross sent at 4/8/2024 12:15 PM EDT -----  Subject: Referral Request    Reason for referral request? Thyroid blood work   Provider patient wants to be referred to(if known):     Provider Phone Number(if known):    Additional Information for Provider? Patient is requesting bloodwork for   thyroid   ---------------------------------------------------------------------------  --------------  CALL BACK INFO    [phone]; OK to leave message on voicemail  ---------------------------------------------------------------------------  --------------  
Dr. Hill, Do you want to order labs prior to her appointment?  
Patient is aware she can have all labs done.  Lab appointment scheduled.   
Patient states she does not recall having lab work done in 9/2022.  Patient states she has not had any labs done with Dr. Hill as of yet, and wants to make sure she only needs thyroid testing.   
She is correct, last labs were Sept 2022, not 2023-- I have put in all labs needed, fasting  
The order for thyroid blood test is in, non-faistng OK.  This is the only bloodwork she needs, rest was done in October and does not need to be repeated  
English

## 2024-06-11 PROBLEM — Z78.9 OTHER SPECIFIED HEALTH STATUS: Chronic | Status: ACTIVE | Noted: 2022-05-06

## 2024-06-17 ENCOUNTER — OUTPATIENT (OUTPATIENT)
Dept: OUTPATIENT SERVICES | Facility: HOSPITAL | Age: 34
LOS: 1 days | End: 2024-06-17
Payer: COMMERCIAL

## 2024-06-17 ENCOUNTER — APPOINTMENT (OUTPATIENT)
Dept: RADIOLOGY | Facility: HOSPITAL | Age: 34
End: 2024-06-17
Payer: COMMERCIAL

## 2024-06-17 ENCOUNTER — TRANSCRIPTION ENCOUNTER (OUTPATIENT)
Age: 34
End: 2024-06-17

## 2024-06-17 DIAGNOSIS — N97.8 FEMALE INFERTILITY OF OTHER ORIGIN: ICD-10-CM

## 2024-06-17 PROCEDURE — 74740 X-RAY FEMALE GENITAL TRACT: CPT | Mod: 26

## 2024-06-17 PROCEDURE — 58340 CATHETER FOR HYSTEROGRAPHY: CPT

## 2024-06-17 PROCEDURE — 74740 X-RAY FEMALE GENITAL TRACT: CPT

## 2024-12-27 ENCOUNTER — ASOB RESULT (OUTPATIENT)
Age: 34
End: 2024-12-27

## 2024-12-27 ENCOUNTER — APPOINTMENT (OUTPATIENT)
Dept: ANTEPARTUM | Facility: CLINIC | Age: 34
End: 2024-12-27

## 2024-12-27 PROCEDURE — 76813 OB US NUCHAL MEAS 1 GEST: CPT

## 2024-12-27 PROCEDURE — 76801 OB US < 14 WKS SINGLE FETUS: CPT

## 2025-06-23 ENCOUNTER — OUTPATIENT (OUTPATIENT)
Dept: OUTPATIENT SERVICES | Facility: HOSPITAL | Age: 35
LOS: 1 days | End: 2025-06-23
Payer: COMMERCIAL

## 2025-06-23 VITALS
SYSTOLIC BLOOD PRESSURE: 127 MMHG | HEIGHT: 63 IN | RESPIRATION RATE: 16 BRPM | TEMPERATURE: 98 F | DIASTOLIC BLOOD PRESSURE: 89 MMHG | WEIGHT: 177.03 LBS | OXYGEN SATURATION: 99 % | HEART RATE: 88 BPM

## 2025-06-23 DIAGNOSIS — Z01.818 ENCOUNTER FOR OTHER PREPROCEDURAL EXAMINATION: ICD-10-CM

## 2025-06-23 DIAGNOSIS — O34.219 MATERNAL CARE FOR UNSPECIFIED TYPE SCAR FROM PREVIOUS CESAREAN DELIVERY: ICD-10-CM

## 2025-06-23 DIAGNOSIS — Z98.891 HISTORY OF UTERINE SCAR FROM PREVIOUS SURGERY: Chronic | ICD-10-CM

## 2025-06-23 LAB
ANION GAP SERPL CALC-SCNC: 14 MMOL/L — SIGNIFICANT CHANGE UP (ref 5–17)
BLD GP AB SCN SERPL QL: NEGATIVE — SIGNIFICANT CHANGE UP
BUN SERPL-MCNC: 12 MG/DL — SIGNIFICANT CHANGE UP (ref 7–23)
CALCIUM SERPL-MCNC: 9.3 MG/DL — SIGNIFICANT CHANGE UP (ref 8.4–10.5)
CHLORIDE SERPL-SCNC: 104 MMOL/L — SIGNIFICANT CHANGE UP (ref 96–108)
CO2 SERPL-SCNC: 17 MMOL/L — LOW (ref 22–31)
CREAT SERPL-MCNC: 0.54 MG/DL — SIGNIFICANT CHANGE UP (ref 0.5–1.3)
EGFR: 124 ML/MIN/1.73M2 — SIGNIFICANT CHANGE UP
EGFR: 124 ML/MIN/1.73M2 — SIGNIFICANT CHANGE UP
GLUCOSE SERPL-MCNC: 70 MG/DL — SIGNIFICANT CHANGE UP (ref 70–99)
HCT VFR BLD CALC: 36.9 % — SIGNIFICANT CHANGE UP (ref 34.5–45)
HGB BLD-MCNC: 12.8 G/DL — SIGNIFICANT CHANGE UP (ref 11.5–15.5)
MCHC RBC-ENTMCNC: 32.2 PG — SIGNIFICANT CHANGE UP (ref 27–34)
MCHC RBC-ENTMCNC: 34.7 G/DL — SIGNIFICANT CHANGE UP (ref 32–36)
MCV RBC AUTO: 92.9 FL — SIGNIFICANT CHANGE UP (ref 80–100)
NRBC # BLD AUTO: 0 K/UL — SIGNIFICANT CHANGE UP (ref 0–0)
NRBC # FLD: 0 K/UL — SIGNIFICANT CHANGE UP (ref 0–0)
NRBC BLD AUTO-RTO: 0 /100 WBCS — SIGNIFICANT CHANGE UP (ref 0–0)
PLATELET # BLD AUTO: 122 K/UL — LOW (ref 150–400)
PMV BLD: 13.6 FL — HIGH (ref 7–13)
POTASSIUM SERPL-MCNC: 3.9 MMOL/L — SIGNIFICANT CHANGE UP (ref 3.5–5.3)
POTASSIUM SERPL-SCNC: 3.9 MMOL/L — SIGNIFICANT CHANGE UP (ref 3.5–5.3)
RBC # BLD: 3.97 M/UL — SIGNIFICANT CHANGE UP (ref 3.8–5.2)
RBC # FLD: 13.4 % — SIGNIFICANT CHANGE UP (ref 10.3–14.5)
RH IG SCN BLD-IMP: POSITIVE — SIGNIFICANT CHANGE UP
SODIUM SERPL-SCNC: 135 MMOL/L — SIGNIFICANT CHANGE UP (ref 135–145)
WBC # BLD: 9.62 K/UL — SIGNIFICANT CHANGE UP (ref 3.8–10.5)
WBC # FLD AUTO: 9.62 K/UL — SIGNIFICANT CHANGE UP (ref 3.8–10.5)

## 2025-06-23 PROCEDURE — 86850 RBC ANTIBODY SCREEN: CPT

## 2025-06-23 PROCEDURE — 85027 COMPLETE CBC AUTOMATED: CPT

## 2025-06-23 PROCEDURE — 86901 BLOOD TYPING SEROLOGIC RH(D): CPT

## 2025-06-23 PROCEDURE — 80048 BASIC METABOLIC PNL TOTAL CA: CPT

## 2025-06-23 PROCEDURE — G0463: CPT

## 2025-06-23 PROCEDURE — 86900 BLOOD TYPING SEROLOGIC ABO: CPT

## 2025-06-23 RX ORDER — SODIUM CHLORIDE 9 G/1000ML
1000 INJECTION, SOLUTION INTRAVENOUS
Refills: 0 | Status: DISCONTINUED | OUTPATIENT
Start: 2025-07-08 | End: 2025-07-08

## 2025-06-23 NOTE — OB PST NOTE - FALL HARM RISK - UNIVERSAL INTERVENTIONS
Bed in lowest position, wheels locked, appropriate side rails in place/Call bell, personal items and telephone in reach/Instruct patient to call for assistance before getting out of bed or chair/Non-slip footwear when patient is out of bed/[place] to call system/Physically safe environment - no spills, clutter or unnecessary equipment/Purposeful Proactive Rounding/Room/bathroom lighting operational, light cord in reach

## 2025-06-23 NOTE — OB PST NOTE - ASSESSMENT
DASI score:7.23  DASI activity: Walking, house chores, cooking Has 3 year old at home.  Loose teeth or denture: denies   DASI score:7.23  DASI activity: Walking, house chores, cooking Has 3 year old at home.  Loose teeth or denture: denies    CAPRINI SCORE    AGE RELATED RISK FACTORS                                                             [ ] Age 41-60 years                                            (1 Point)  [ ] Age: 61-74 years                                           (2 Points)                 [ ] Age= 75 years                                                (3 Points)             DISEASE RELATED RISK FACTORS                                                       [ ] Edema in the lower extremities                 (1 Point)                     [ ] Varicose veins                                               (1 Point)                                 [x ] BMI > 25 Kg/m2                                            (1 Point)                                  [ ] Serious infection (ie PNA)                            (1 Point)                     [ ] Lung disease ( COPD, Emphysema)            (1 Point)                                                                          [ ] Acute myocardial infarction                         (1 Point)                  [ ] Congestive heart failure (in the previous month)  (1 Point)         [ ] Inflammatory bowel disease                            (1 Point)                  [ ] Central venous access, PICC or Port               (2 points)       (within the last month)                                                                [ ] Stroke (in the previous month)                        (5 Points)    [ ] Previous or present malignancy                       (2 points)                                                                                                                                                         HEMATOLOGY RELATED FACTORS                                                         [ ] Prior episodes of VTE                                     (3 Points)                     [ ] Positive family history for VTE                      (3 Points)                  [ ] Prothrombin 79209 A                                     (3 Points)                     [ ] Factor V Leiden                                                (3 Points)                        [ ] Lupus anticoagulants                                      (3 Points)                                                           [ ] Anticardiolipin antibodies                              (3 Points)                                                       [ ] High homocysteine in the blood                   (3 Points)                                             [ ] Other congenital or acquired thrombophilia      (3 Points)                                                [ ] Heparin induced thrombocytopenia                  (3 Points)                                        MOBILITY RELATED FACTORS  [ ] Bed rest                                                         (1 Point)  [ ] Plaster cast                                                    (2 points)  [ ] Bed bound for more than 72 hours           (2 Points)    GENDER SPECIFIC FACTORS  [x ] Pregnancy or had a baby within the last month   (1 Point)  [ ] Post-partum < 6 weeks                                   (1 Point)  [ ] Hormonal therapy  or oral contraception   (1 Point)  [ ] History of pregnancy complications              (1 point)  [ ] Unexplained or recurrent              (1 Point)    OTHER RISK FACTORS                                           (1 Point)  [ ] BMI >40, smoking, diabetes requiring insulin, chemotherapy  blood transfusions and length of surgery over 2 hours    SURGERY RELATED RISK FACTORS  [ ]  Section within the last month     (1 Point)  [ ] Minor surgery                                                  (1 Point)  [ ] Arthroscopic surgery                                       (2 Points)  [x ] Planned major surgery lasting more            (2 Points)      than 45 minutes     [ ] Elective hip or knee joint replacement       (5 points)       surgery                                                TRAUMA RELATED RISK FACTORS  [ ] Fracture of the hip, pelvis, or leg                       (5 Points)  [ ] Spinal cord injury resulting in paralysis             (5 points)       (in the previous month)    [ ] Paralysis  (less than 1 month)                             (5 Points)  [ ] Multiple Trauma within 1 month                        (5 Points)    Total Score [     4   ]    Caprini Score 0-2: Low Risk, NO VTE prophylaxis required for most patients, encourage ambulation  Caprini Score 3-6: Moderate Risk , pharmacologic VTE prophylaxis is indicated for most patients (in the absence of contraindications)  Caprini Score Greater than or =7: High risk, pharmocologic VTE prophylaxis indicated for most patients (in the absence of contraindications)                                 DASI score:7.23  DASI activity: Walking, house chores, cooking Has 3 year old at home.  Loose teeth or denture: denies    CAPRINI SCORE    AGE RELATED RISK FACTORS                                                             [ ] Age 41-60 years                                            (1 Point)  [ ] Age: 61-74 years                                           (2 Points)                 [ ] Age= 75 years                                                (3 Points)             DISEASE RELATED RISK FACTORS                                                       [ ] Edema in the lower extremities                 (1 Point)                     [ ] Varicose veins                                               (1 Point)                                 [x ] BMI > 25 Kg/m2                                            (1 Point)                                  [ ] Serious infection (ie PNA)                            (1 Point)                     [ ] Lung disease ( COPD, Emphysema)            (1 Point)                                                                          [ ] Acute myocardial infarction                         (1 Point)                  [ ] Congestive heart failure (in the previous month)  (1 Point)         [ ] Inflammatory bowel disease                            (1 Point)                  [ ] Central venous access, PICC or Port               (2 points)       (within the last month)                                                                [ ] Stroke (in the previous month)                        (5 Points)    [ ] Previous or present malignancy                       (2 points)                                                                                                                                                         HEMATOLOGY RELATED FACTORS                                                         [ ] Prior episodes of VTE                                     (3 Points)                     [ ] Positive family history for VTE                      (3 Points)                  [ ] Prothrombin 37140 A                                     (3 Points)                     [ ] Factor V Leiden                                                (3 Points)                        [ ] Lupus anticoagulants                                      (3 Points)                                                           [ ] Anticardiolipin antibodies                              (3 Points)                                                       [ ] High homocysteine in the blood                   (3 Points)                                             [ ] Other congenital or acquired thrombophilia      (3 Points)                                                [ ] Heparin induced thrombocytopenia                  (3 Points)                                        MOBILITY RELATED FACTORS  [ ] Bed rest                                                         (1 Point)  [ ] Plaster cast                                                    (2 points)  [ ] Bed bound for more than 72 hours           (2 Points)    GENDER SPECIFIC FACTORS  [x ] Pregnancy or had a baby within the last month   (1 Point)  [ ] Post-partum < 6 weeks                                   (1 Point)  [ ] Hormonal therapy  or oral contraception   (1 Point)  [ ] History of pregnancy complications              (1 point)  [ ] Unexplained or recurrent              (1 Point)    OTHER RISK FACTORS                                           (1 Point)  [ ] BMI >40, smoking, diabetes requiring insulin, chemotherapy  blood transfusions and length of surgery over 2 hours    SURGERY RELATED RISK FACTORS  [x ]  Section within the last month     (1 Point)  [ ] Minor surgery                                                  (1 Point)  [ ] Arthroscopic surgery                                       (2 Points)  [ ] Planned major surgery lasting more            (2 Points)      than 45 minutes     [ ] Elective hip or knee joint replacement       (5 points)       surgery                                                TRAUMA RELATED RISK FACTORS  [ ] Fracture of the hip, pelvis, or leg                       (5 Points)  [ ] Spinal cord injury resulting in paralysis             (5 points)       (in the previous month)    [ ] Paralysis  (less than 1 month)                             (5 Points)  [ ] Multiple Trauma within 1 month                        (5 Points)    Total Score [     3   ]    Caprini Score 0-2: Low Risk, NO VTE prophylaxis required for most patients, encourage ambulation  Caprini Score 3-6: Moderate Risk , pharmacologic VTE prophylaxis is indicated for most patients (in the absence of contraindications)  Caprini Score Greater than or =7: High risk, pharmocologic VTE prophylaxis indicated for most patients (in the absence of contraindications)

## 2025-06-23 NOTE — OB PST NOTE - NSHPPHYSICALEXAM_GEN_ALL_CORE
· Constitutional  well-groomed; no distress;  · Eyes      PERRL; EOMI; conjunctiva clear  · Respiratory     clear to auscultation bilaterally; no wheezes; no rales; no rhonchi  · Cardiovascular  regular rate and rhythm; S1 S2 present; b/l pedal edema +1 non-pitting ( R>L)  · Gastrointestinal      normal active bowel sounds  · Neurological    sensation intact; responds to pain; responds to verbal commands  · Skin      warm and dry;  · Musculoskeletal strength 5/5 bilateral upper extremities; strength 5/5 bilateral lower extremities  · Psychiatric     alert and oriented x3;pleasant

## 2025-06-23 NOTE — OB PST NOTE - NS_OBGYNHISTORY_OBGYN_ALL_OB_FT
GynHx: denies history of STI, denies history of abnormal pap smears, denies history of fibroids/cysts.

## 2025-06-23 NOTE — OB PST NOTE - PROBLEM SELECTOR PLAN 1
Scheduled for Repeat  on 25 with Dr. Nguyen.   Pre-operative instructions and chlorhexidine given.  Labs: cbc, bmp, T/S drawn in PST  DOS: ABO

## 2025-06-23 NOTE — OB PST NOTE - NSCAFFEINETYPE_GEN_ALL_CORE_SD
Spoke w/Dr. Hoover Castleman regarding infant status, had Dr. Beatriz Wilder speak directly with Dr. Hoover Castleman regarding infant and required upcoming consults coffee

## 2025-06-23 NOTE — OB PST NOTE - HISTORY OF PRESENT ILLNESS
34 year old [id number] no pertinent medical history  presenting to PST prior to schedule Repeat  on 2025 with Patrick.      Denies any vaginal bleeding/ potting, loss of fluid. Denies decreased fetal movement. + constipation,  Denies dysuria, hematuria.   B/l pedel edema (R>L) improves with elevation. Denies leg/calf pain.      34 year old [id number] currently 37w3d,  no pertinent medical history  presenting to PST prior to schedule Repeat  on 2025 with Patrick.      Denies any vaginal bleeding/ potting, loss of fluid. Denies decreased fetal movement. + constipation,  Denies dysuria, hematuria.   B/l pedel edema (R>L) improves with elevation. Denies leg/calf pain.

## 2025-06-23 NOTE — OB PST NOTE - NS PRO PASSIVE SMOKE EXP
George Yadav- I just saw this patient for preop.  She is scheduled for a bypass next week Thursday.  She was telling me that she was \"not feeling good\" as her Grandma passed away earlier this year and her cousin just passed away this week.  I asked her if she felt like this was a good time to have surgery.  She said yes but I think it would be good if you were able to touch base with her prior to surgery.  She also thought it was a good idea.  Could we get something scheduled soon?    Thank you so much!    ~Mala   
No

## 2025-07-08 ENCOUNTER — INPATIENT (INPATIENT)
Facility: HOSPITAL | Age: 35
LOS: 1 days | Discharge: HOME | End: 2025-07-10
Attending: OBSTETRICS & GYNECOLOGY | Admitting: OBSTETRICS & GYNECOLOGY
Payer: COMMERCIAL

## 2025-07-08 VITALS — HEART RATE: 97 BPM | OXYGEN SATURATION: 100 %

## 2025-07-08 DIAGNOSIS — Z98.891 HISTORY OF UTERINE SCAR FROM PREVIOUS SURGERY: Chronic | ICD-10-CM

## 2025-07-08 DIAGNOSIS — O34.219 MATERNAL CARE FOR UNSPECIFIED TYPE SCAR FROM PREVIOUS CESAREAN DELIVERY: ICD-10-CM

## 2025-07-08 LAB
BLD GP AB SCN SERPL QL: NEGATIVE — SIGNIFICANT CHANGE UP
RH IG SCN BLD-IMP: POSITIVE — SIGNIFICANT CHANGE UP
T PALLIDUM AB TITR SER: NEGATIVE — SIGNIFICANT CHANGE UP

## 2025-07-08 PROCEDURE — 86780 TREPONEMA PALLIDUM: CPT

## 2025-07-08 PROCEDURE — 86901 BLOOD TYPING SEROLOGIC RH(D): CPT

## 2025-07-08 PROCEDURE — 86900 BLOOD TYPING SEROLOGIC ABO: CPT

## 2025-07-08 PROCEDURE — 86850 RBC ANTIBODY SCREEN: CPT

## 2025-07-08 PROCEDURE — 36415 COLL VENOUS BLD VENIPUNCTURE: CPT

## 2025-07-08 RX ORDER — DIPHENHYDRAMINE HCL 12.5MG/5ML
25 ELIXIR ORAL EVERY 6 HOURS
Refills: 0 | Status: DISCONTINUED | OUTPATIENT
Start: 2025-07-08 | End: 2025-07-10

## 2025-07-08 RX ORDER — OXYCODONE HYDROCHLORIDE 30 MG/1
10 TABLET ORAL
Refills: 0 | Status: DISCONTINUED | OUTPATIENT
Start: 2025-07-08 | End: 2025-07-09

## 2025-07-08 RX ORDER — OXYCODONE HYDROCHLORIDE 30 MG/1
5 TABLET ORAL
Refills: 0 | Status: DISCONTINUED | OUTPATIENT
Start: 2025-07-08 | End: 2025-07-10

## 2025-07-08 RX ORDER — MODIFIED LANOLIN 100 %
1 CREAM (GRAM) TOPICAL EVERY 6 HOURS
Refills: 0 | Status: DISCONTINUED | OUTPATIENT
Start: 2025-07-08 | End: 2025-07-10

## 2025-07-08 RX ORDER — SIMETHICONE 80 MG
80 TABLET,CHEWABLE ORAL EVERY 4 HOURS
Refills: 0 | Status: DISCONTINUED | OUTPATIENT
Start: 2025-07-08 | End: 2025-07-10

## 2025-07-08 RX ORDER — ONDANSETRON HCL/PF 4 MG/2 ML
4 VIAL (ML) INJECTION EVERY 6 HOURS
Refills: 0 | Status: DISCONTINUED | OUTPATIENT
Start: 2025-07-08 | End: 2025-07-09

## 2025-07-08 RX ORDER — HEPARIN SODIUM 1000 [USP'U]/ML
5000 INJECTION INTRAVENOUS; SUBCUTANEOUS EVERY 12 HOURS
Refills: 0 | Status: DISCONTINUED | OUTPATIENT
Start: 2025-07-08 | End: 2025-07-10

## 2025-07-08 RX ORDER — DEXAMETHASONE 0.5 MG/1
4 TABLET ORAL EVERY 6 HOURS
Refills: 0 | Status: DISCONTINUED | OUTPATIENT
Start: 2025-07-08 | End: 2025-07-09

## 2025-07-08 RX ORDER — SODIUM CHLORIDE 9 G/1000ML
1000 INJECTION, SOLUTION INTRAVENOUS
Refills: 0 | Status: DISCONTINUED | OUTPATIENT
Start: 2025-07-08 | End: 2025-07-10

## 2025-07-08 RX ORDER — NALOXONE HYDROCHLORIDE 0.4 MG/ML
0.1 INJECTION, SOLUTION INTRAMUSCULAR; INTRAVENOUS; SUBCUTANEOUS
Refills: 0 | Status: DISCONTINUED | OUTPATIENT
Start: 2025-07-08 | End: 2025-07-09

## 2025-07-08 RX ORDER — KETOROLAC TROMETHAMINE 30 MG/ML
30 INJECTION, SOLUTION INTRAMUSCULAR; INTRAVENOUS EVERY 6 HOURS
Refills: 0 | Status: DISCONTINUED | OUTPATIENT
Start: 2025-07-08 | End: 2025-07-09

## 2025-07-08 RX ORDER — OXYCODONE HYDROCHLORIDE 30 MG/1
5 TABLET ORAL ONCE
Refills: 0 | Status: DISCONTINUED | OUTPATIENT
Start: 2025-07-08 | End: 2025-07-10

## 2025-07-08 RX ORDER — CEFAZOLIN SODIUM IN 0.9 % NACL 3 G/100 ML
2000 INTRAVENOUS SOLUTION, PIGGYBACK (ML) INTRAVENOUS ONCE
Refills: 0 | Status: COMPLETED | OUTPATIENT
Start: 2025-07-08 | End: 2025-07-08

## 2025-07-08 RX ORDER — MAGNESIUM HYDROXIDE 400 MG/5ML
30 SUSPENSION ORAL
Refills: 0 | Status: DISCONTINUED | OUTPATIENT
Start: 2025-07-08 | End: 2025-07-10

## 2025-07-08 RX ORDER — IBUPROFEN 200 MG
600 TABLET ORAL EVERY 6 HOURS
Refills: 0 | Status: COMPLETED | OUTPATIENT
Start: 2025-07-08 | End: 2026-06-06

## 2025-07-08 RX ORDER — ACETAMINOPHEN 500 MG/5ML
975 LIQUID (ML) ORAL
Refills: 0 | Status: DISCONTINUED | OUTPATIENT
Start: 2025-07-08 | End: 2025-07-10

## 2025-07-08 RX ORDER — OXYTOCIN-SODIUM CHLORIDE 0.9% IV SOLN 30 UNIT/500ML 30-0.9/5 UT/ML-%
42 SOLUTION INTRAVENOUS
Qty: 30 | Refills: 0 | Status: DISCONTINUED | OUTPATIENT
Start: 2025-07-08 | End: 2025-07-10

## 2025-07-08 RX ORDER — OXYCODONE HYDROCHLORIDE 30 MG/1
5 TABLET ORAL
Refills: 0 | Status: DISCONTINUED | OUTPATIENT
Start: 2025-07-08 | End: 2025-07-09

## 2025-07-08 RX ORDER — CITRIC ACID/SODIUM CITRATE 300-500 MG
15 SOLUTION, ORAL ORAL ONCE
Refills: 0 | Status: COMPLETED | OUTPATIENT
Start: 2025-07-08 | End: 2025-07-08

## 2025-07-08 RX ORDER — NALBUPHINE HYDROCHLORIDE 10 MG/ML
2.5 INJECTION INTRAMUSCULAR; INTRAVENOUS; SUBCUTANEOUS EVERY 6 HOURS
Refills: 0 | Status: DISCONTINUED | OUTPATIENT
Start: 2025-07-08 | End: 2025-07-09

## 2025-07-08 RX ADMIN — NALBUPHINE HYDROCHLORIDE 2.5 MILLIGRAM(S): 10 INJECTION INTRAMUSCULAR; INTRAVENOUS; SUBCUTANEOUS at 12:16

## 2025-07-08 RX ADMIN — Medication 1 APPLICATION(S): at 06:50

## 2025-07-08 RX ADMIN — HEPARIN SODIUM 5000 UNIT(S): 1000 INJECTION INTRAVENOUS; SUBCUTANEOUS at 18:04

## 2025-07-08 RX ADMIN — SODIUM CHLORIDE 125 MILLILITER(S): 9 INJECTION, SOLUTION INTRAVENOUS at 07:08

## 2025-07-08 RX ADMIN — KETOROLAC TROMETHAMINE 30 MILLIGRAM(S): 30 INJECTION, SOLUTION INTRAMUSCULAR; INTRAVENOUS at 18:05

## 2025-07-08 RX ADMIN — Medication 15 MILLILITER(S): at 07:37

## 2025-07-08 RX ADMIN — NALBUPHINE HYDROCHLORIDE 2.5 MILLIGRAM(S): 10 INJECTION INTRAMUSCULAR; INTRAVENOUS; SUBCUTANEOUS at 22:30

## 2025-07-08 RX ADMIN — KETOROLAC TROMETHAMINE 30 MILLIGRAM(S): 30 INJECTION, SOLUTION INTRAMUSCULAR; INTRAVENOUS at 23:54

## 2025-07-08 RX ADMIN — KETOROLAC TROMETHAMINE 30 MILLIGRAM(S): 30 INJECTION, SOLUTION INTRAMUSCULAR; INTRAVENOUS at 12:18

## 2025-07-08 RX ADMIN — Medication 20 MILLIGRAM(S): at 07:37

## 2025-07-08 RX ADMIN — NALBUPHINE HYDROCHLORIDE 2.5 MILLIGRAM(S): 10 INJECTION INTRAMUSCULAR; INTRAVENOUS; SUBCUTANEOUS at 23:00

## 2025-07-08 RX ADMIN — KETOROLAC TROMETHAMINE 30 MILLIGRAM(S): 30 INJECTION, SOLUTION INTRAMUSCULAR; INTRAVENOUS at 18:35

## 2025-07-08 RX ADMIN — Medication 975 MILLIGRAM(S): at 17:23

## 2025-07-08 NOTE — OB RN DELIVERY SUMMARY - NSSELHIDDEN_OBGYN_ALL_OB_FT
[NS_DeliveryAttending1_OBGYN_ALL_OB_FT:[id number]],[NS_DeliveryAssist1_OBGYN_ALL_OB_FT:[id number]],[NS_DeliveryRN_OBGYN_ALL_OB_FT:[id number]]

## 2025-07-08 NOTE — OB PROVIDER H&P - NSLOWPPHRISK_OBGYN_A_OB
Vides Pregnancy/Less than or equal to 4 previous vaginal births/No known bleeding disorder/No history of postpartum hemorrhage

## 2025-07-08 NOTE — OB PROVIDER H&P - ASSESSMENT
33yo  @39w4d p/f scheduled rLTCS.  - admit to L&D  - GBS neg  - EFW 2863g  - Reglan/Pepcid/Bicitra  - Routine Labs  - FHT/TOCO  - Anesthesia Consult  - for LTCS     Dw Dr. Jose Guadalupe Acuna [facility] PGY2

## 2025-07-08 NOTE — OB PROVIDER DELIVERY SUMMARY - NSPROVIDERDELIVERYNOTE_OBGYN_ALL_OB_FT
Scheduled rLTCS  Viable vertex female infant  Hysterotomy closed in 1 layer using PDS  Hysterotomy imbricated w/monocryl  Area of bleeding noted at R angle into peritoneum w/1cm hematoma noted, no evidence of expansion on or off tension  Uterus exteriorized to aid in visualization  hemostatic on and off tension   Grossly normal uterus, tubes, and ovaries  Abdomen closed in standard fashion  Pt and infant to recovery in stable condition  Dictation #  QBL:  356mL        IVF:  2000mL        UOP:  600mL Scheduled rLTCS  Viable vertex female infant  Hysterotomy closed in 1 layer using PDS  Hysterotomy imbricated w/monocryl  Area of bleeding noted at R angle into peritoneum w/1cm hematoma noted, no evidence of expansion on or off tension  Uterus exteriorized to aid in visualization  hemostatic on and off tension   Grossly normal uterus, tubes, and ovaries  Abdomen closed in standard fashion  Pt and infant to recovery in stable condition  Dictation #[id number]  QBL:  356mL        IVF:  2000mL        UOP:  600mL

## 2025-07-08 NOTE — OB PROVIDER H&P - NSHPPHYSICALEXAM_GEN_ALL_CORE
Gen: NAD  CV: clinically well perfused  Pulm: unlabored respirations  Abd: soft, NT, ND, gravid, no rebound or guarding  SVE: deferred  FHT: baseline 150, mod alberto, + accels, - decels  TOCO: acontractile  Sono: cephalic, placenta not well-visualized,

## 2025-07-08 NOTE — OB PROVIDER H&P - HISTORY OF PRESENT ILLNESS
34y  @39w4d p/f scheduled rLTCS. –VB, -LOF, -Ctx, +FM.   PNC: unc  GBS: neg   EFW: 2863g  ObHx:  FT pLTCS breech 7#3  GynHx: denies  MedHx: denies  PSHx: denies  PsychHx: denies  SocialHx: denies  AllergyHx: denies  RxHx: PNV

## 2025-07-08 NOTE — OB PROVIDER DELIVERY SUMMARY - NSSELHIDDEN_OBGYN_ALL_OB_FT
[NS_DeliveryAttending1_OBGYN_ALL_OB_FT:[id number]],[NS_DeliveryAssist1_OBGYN_ALL_OB_FT:[id number]],[NS_DeliveryRN_OBGYN_ALL_OB_FT:[id number]]

## 2025-07-08 NOTE — OB RN PREOPERATIVE CHECKLIST - ISOLATION PRECAUTIONS
Catheter is repositioned to the and hemodynamics recorded left ventricle. Used Pigtail Catheter none

## 2025-07-08 NOTE — OB RN PATIENT PROFILE - NS_ISOLATION_OBGYN_ALL_OB
BIBEMS from [facility] c/o low hemoglobin and hematocrit (unknown value, not on sending paperwork). Patient A&Ox2, denies any pain or discomfort at triage. None

## 2025-07-08 NOTE — PRE-ANESTHESIA EVALUATION ADULT - NSANTHPMHFT_GEN_ALL_CORE
as above;  s/p primary C/S for breeech 2022, spinal w/o cx's ([facility])  denies other signif. PMH/PSH

## 2025-07-08 NOTE — OB RN PATIENT PROFILE - NS_PARA_OBGYN_ALL_OB_NU
Your Diagnosis is:  Lightheadedness    Return to the Emergency department for recurrent or worsening lightheadedness, shortness of breath, chest pain, syncope ,or for any other issues or concerns.    Your new prescriptions are: None    Procedures done today: None    Additional instructions:  Drink plenty of fluids and eat a well-balanced diet.  Follow up with a PCP as discussed.  Return to ER with any new or worsening symptoms.       1

## 2025-07-08 NOTE — OB RN INTRAOPERATIVE NOTE - NSSELHIDDEN_OBGYN_ALL_OB_FT
[NS_DeliveryAttending1_OBGYN_ALL_OB_FT:[id number]],[NS_DeliveryAssist1_OBGYN_ALL_OB_FT:[id number]],[NS_DeliveryRN_OBGYN_ALL_OB_FT:[id number]]

## 2025-07-08 NOTE — OB RN INTRAOPERATIVE NOTE - NS_UTERINECLOSURE_OBGYN_ALL_OB_DT
Date: 05/13/24  Procedure: Labor Epidural         Relevant Problems   No relevant active problems       Physical Exam    Airway   Mallampati: II  TM distance: >3 FB  Neck ROM: full       Cardiovascular - normal exam  Rhythm: regular  Rate: normal  (-) murmur     Dental - normal exam           Pulmonary - normal exam  Breath sounds clear to auscultation     Abdominal    Neurological - normal exam                   Anesthesia Plan    ASA 2       Plan - epidural   Neuraxial block will be labor analgesia                  Pertinent diagnostic labs and testing reviewed    Informed Consent:    Anesthetic plan and risks discussed with patient.           08-Jul-2025 10:04

## 2025-07-08 NOTE — OB RN DELIVERY SUMMARY - NS_SEPSISRSKCALC_OBGYN_ALL_OB_FT
EOS calculated successfully. EOS Risk Factor: 0.5/1000 live births ([facility] national incidence); GA=39w4d; Temp=98.2; ROM=0; GBS='Negative'; Antibiotics='No antibiotics or any antibiotics < 2 hrs prior to birth'

## 2025-07-09 LAB
BASOPHILS # BLD AUTO: 0.04 K/UL — SIGNIFICANT CHANGE UP (ref 0–0.2)
BASOPHILS NFR BLD AUTO: 0.3 % — SIGNIFICANT CHANGE UP (ref 0–2)
EOSINOPHIL # BLD AUTO: 0.06 K/UL — SIGNIFICANT CHANGE UP (ref 0–0.5)
EOSINOPHIL NFR BLD AUTO: 0.4 % — SIGNIFICANT CHANGE UP (ref 0–6)
HCT VFR BLD CALC: 36.3 % — SIGNIFICANT CHANGE UP (ref 34.5–45)
HGB BLD-MCNC: 12.5 G/DL — SIGNIFICANT CHANGE UP (ref 11.5–15.5)
IMM GRANULOCYTES # BLD AUTO: 0.14 K/UL — HIGH (ref 0–0.07)
IMM GRANULOCYTES NFR BLD AUTO: 1 % — HIGH (ref 0–0.9)
LYMPHOCYTES # BLD AUTO: 2.41 K/UL — SIGNIFICANT CHANGE UP (ref 1–3.3)
LYMPHOCYTES NFR BLD AUTO: 16.6 % — SIGNIFICANT CHANGE UP (ref 13–44)
MCHC RBC-ENTMCNC: 33 PG — SIGNIFICANT CHANGE UP (ref 27–34)
MCHC RBC-ENTMCNC: 34.4 G/DL — SIGNIFICANT CHANGE UP (ref 32–36)
MCV RBC AUTO: 95.8 FL — SIGNIFICANT CHANGE UP (ref 80–100)
MONOCYTES # BLD AUTO: 1.02 K/UL — HIGH (ref 0–0.9)
MONOCYTES NFR BLD AUTO: 7 % — SIGNIFICANT CHANGE UP (ref 2–14)
NEUTROPHILS # BLD AUTO: 10.87 K/UL — HIGH (ref 1.8–7.4)
NEUTROPHILS NFR BLD AUTO: 74.7 % — SIGNIFICANT CHANGE UP (ref 43–77)
NRBC # BLD AUTO: 0 K/UL — SIGNIFICANT CHANGE UP (ref 0–0)
NRBC # FLD: 0 K/UL — SIGNIFICANT CHANGE UP (ref 0–0)
NRBC BLD AUTO-RTO: 0 /100 WBCS — SIGNIFICANT CHANGE UP (ref 0–0)
PLATELET # BLD AUTO: 120 K/UL — LOW (ref 150–400)
PMV BLD: 13.8 FL — HIGH (ref 7–13)
RBC # BLD: 3.79 M/UL — LOW (ref 3.8–5.2)
RBC # FLD: 13.8 % — SIGNIFICANT CHANGE UP (ref 10.3–14.5)
WBC # BLD: 14.54 K/UL — HIGH (ref 3.8–10.5)
WBC # FLD AUTO: 14.54 K/UL — HIGH (ref 3.8–10.5)

## 2025-07-09 PROCEDURE — 85025 COMPLETE CBC W/AUTO DIFF WBC: CPT

## 2025-07-09 PROCEDURE — 36415 COLL VENOUS BLD VENIPUNCTURE: CPT

## 2025-07-09 PROCEDURE — 86850 RBC ANTIBODY SCREEN: CPT

## 2025-07-09 PROCEDURE — 86780 TREPONEMA PALLIDUM: CPT

## 2025-07-09 PROCEDURE — 86901 BLOOD TYPING SEROLOGIC RH(D): CPT

## 2025-07-09 PROCEDURE — 86900 BLOOD TYPING SEROLOGIC ABO: CPT

## 2025-07-09 RX ORDER — IBUPROFEN 200 MG
600 TABLET ORAL EVERY 6 HOURS
Refills: 0 | Status: DISCONTINUED | OUTPATIENT
Start: 2025-07-09 | End: 2025-07-10

## 2025-07-09 RX ADMIN — Medication 975 MILLIGRAM(S): at 20:52

## 2025-07-09 RX ADMIN — KETOROLAC TROMETHAMINE 30 MILLIGRAM(S): 30 INJECTION, SOLUTION INTRAMUSCULAR; INTRAVENOUS at 05:47

## 2025-07-09 RX ADMIN — Medication 975 MILLIGRAM(S): at 04:00

## 2025-07-09 RX ADMIN — Medication 975 MILLIGRAM(S): at 21:14

## 2025-07-09 RX ADMIN — Medication 600 MILLIGRAM(S): at 11:47

## 2025-07-09 RX ADMIN — Medication 975 MILLIGRAM(S): at 03:24

## 2025-07-09 RX ADMIN — Medication 975 MILLIGRAM(S): at 09:10

## 2025-07-09 RX ADMIN — KETOROLAC TROMETHAMINE 30 MILLIGRAM(S): 30 INJECTION, SOLUTION INTRAMUSCULAR; INTRAVENOUS at 00:30

## 2025-07-09 RX ADMIN — Medication 975 MILLIGRAM(S): at 15:24

## 2025-07-09 RX ADMIN — HEPARIN SODIUM 5000 UNIT(S): 1000 INJECTION INTRAVENOUS; SUBCUTANEOUS at 05:46

## 2025-07-09 RX ADMIN — Medication 975 MILLIGRAM(S): at 15:55

## 2025-07-09 RX ADMIN — Medication 975 MILLIGRAM(S): at 08:41

## 2025-07-09 RX ADMIN — Medication 600 MILLIGRAM(S): at 18:11

## 2025-07-09 RX ADMIN — Medication 80 MILLIGRAM(S): at 20:52

## 2025-07-09 RX ADMIN — Medication 600 MILLIGRAM(S): at 17:37

## 2025-07-09 RX ADMIN — Medication 600 MILLIGRAM(S): at 12:20

## 2025-07-09 RX ADMIN — KETOROLAC TROMETHAMINE 30 MILLIGRAM(S): 30 INJECTION, SOLUTION INTRAMUSCULAR; INTRAVENOUS at 06:20

## 2025-07-09 RX ADMIN — HEPARIN SODIUM 5000 UNIT(S): 1000 INJECTION INTRAVENOUS; SUBCUTANEOUS at 17:37

## 2025-07-10 ENCOUNTER — TRANSCRIPTION ENCOUNTER (OUTPATIENT)
Age: 35
End: 2025-07-10

## 2025-07-10 VITALS
HEART RATE: 88 BPM | RESPIRATION RATE: 18 BRPM | DIASTOLIC BLOOD PRESSURE: 78 MMHG | SYSTOLIC BLOOD PRESSURE: 115 MMHG | OXYGEN SATURATION: 98 % | TEMPERATURE: 99 F

## 2025-07-10 PROCEDURE — 86780 TREPONEMA PALLIDUM: CPT

## 2025-07-10 PROCEDURE — 86900 BLOOD TYPING SEROLOGIC ABO: CPT

## 2025-07-10 PROCEDURE — 86901 BLOOD TYPING SEROLOGIC RH(D): CPT

## 2025-07-10 PROCEDURE — 85025 COMPLETE CBC W/AUTO DIFF WBC: CPT

## 2025-07-10 PROCEDURE — 86850 RBC ANTIBODY SCREEN: CPT

## 2025-07-10 PROCEDURE — 36415 COLL VENOUS BLD VENIPUNCTURE: CPT

## 2025-07-10 PROCEDURE — 59050 FETAL MONITOR W/REPORT: CPT

## 2025-07-10 PROCEDURE — 59025 FETAL NON-STRESS TEST: CPT

## 2025-07-10 RX ORDER — IBUPROFEN 200 MG
1 TABLET ORAL
Qty: 0 | Refills: 0 | DISCHARGE
Start: 2025-07-10

## 2025-07-10 RX ORDER — PRENATAL 136/IRON/FOLIC ACID 27 MG-1 MG
1 TABLET ORAL
Refills: 0 | DISCHARGE

## 2025-07-10 RX ADMIN — Medication 600 MILLIGRAM(S): at 06:30

## 2025-07-10 RX ADMIN — Medication 975 MILLIGRAM(S): at 03:45

## 2025-07-10 RX ADMIN — Medication 600 MILLIGRAM(S): at 11:29

## 2025-07-10 RX ADMIN — Medication 600 MILLIGRAM(S): at 00:08

## 2025-07-10 RX ADMIN — Medication 600 MILLIGRAM(S): at 06:04

## 2025-07-10 RX ADMIN — HEPARIN SODIUM 5000 UNIT(S): 1000 INJECTION INTRAVENOUS; SUBCUTANEOUS at 06:04

## 2025-07-10 RX ADMIN — MAGNESIUM HYDROXIDE 30 MILLILITER(S): 400 SUSPENSION ORAL at 08:34

## 2025-07-10 RX ADMIN — Medication 975 MILLIGRAM(S): at 08:29

## 2025-07-10 RX ADMIN — Medication 600 MILLIGRAM(S): at 00:40

## 2025-07-10 RX ADMIN — Medication 975 MILLIGRAM(S): at 03:16

## 2025-07-10 NOTE — PROGRESS NOTE ADULT - SUBJECTIVE AND OBJECTIVE BOX
Day 1 of Anesthesia Pain Management Service    SUBJECTIVE: Doing ok  Pain Scale Score:          [X] Refer to charted pain scores    THERAPY:    s/p     100 mcg PF morphine on 7\8\25       MEDICATIONS  (STANDING):  acetaminophen     Tablet .. 975 milliGRAM(s) Oral <User Schedule>  diphtheria/tetanus/pertussis (acellular) Vaccine (Adacel) 0.5 milliLiter(s) IntraMuscular once  heparin   Injectable 5000 Unit(s) SubCutaneous every 12 hours  ibuprofen  Tablet. 600 milliGRAM(s) Oral every 6 hours  lactated ringers. 1000 milliLiter(s) (125 mL/Hr) IV Continuous <Continuous>  morphine PF Spinal 0.1 milliGRAM(s) IntraThecal. once  oxytocin Infusion 42 milliUNIT(s)/Min (42 mL/Hr) IV Continuous <Continuous>    MEDICATIONS  (PRN):  dexAMETHasone  Injectable 4 milliGRAM(s) IV Push every 6 hours PRN Nausea  diphenhydrAMINE 25 milliGRAM(s) Oral every 6 hours PRN Pruritus  lanolin Ointment 1 Application(s) Topical every 6 hours PRN Sore Nipples  magnesium hydroxide Suspension 30 milliLiter(s) Oral two times a day PRN Constipation  nalbuphine Injectable 2.5 milliGRAM(s) IV Push every 6 hours PRN Pruritus  naloxone Injectable 0.1 milliGRAM(s) IV Push every 3 minutes PRN For ANY of the following changes in patient status:  A. Breaths Per Minute LESS THAN 10, B. Oxygen saturation LESS THAN 90%, C. Sedation score of 6 for Stop After: 4 Times  ondansetron Injectable 4 milliGRAM(s) IV Push every 6 hours PRN Nausea  oxyCODONE    IR 5 milliGRAM(s) Oral every 3 hours PRN Mild Pain (1 - 3)  oxyCODONE    IR 10 milliGRAM(s) Oral every 3 hours PRN Moderate Pain (4 - 6)  oxyCODONE    IR 5 milliGRAM(s) Oral every 3 hours PRN Moderate to Severe Pain (4-10)  oxyCODONE    IR 5 milliGRAM(s) Oral once PRN Moderate to Severe Pain (4-10)  simethicone 80 milliGRAM(s) Chew every 4 hours PRN Gas      OBJECTIVE:    Sedation:        	[X] Alert	 [ ] Drowsy	[ ] Arousable      [ ] Asleep       [ ] Unresponsive    Side Effects:	[X] None 	[ ] Nausea	[ ] Vomiting         [ ] Pruritus  		[ ] Weakness            [ ] Numbness	          [ ] Other:    Vital Signs Last 24 Hrs  T(C): 37.1 (09 Jul 2025 05:42), Max: 37.1 (08 Jul 2025 21:14)  T(F): 98.7 (09 Jul 2025 05:42), Max: 98.8 (08 Jul 2025 21:14)  HR: 75 (09 Jul 2025 05:42) (54 - 96)  BP: 116/76 (09 Jul 2025 05:42) (104/55 - 134/80)  BP(mean): 100 (08 Jul 2025 17:20) (77 - 100)  RR: 18 (09 Jul 2025 05:42) (18 - 18)  SpO2: 97% (09 Jul 2025 05:42) (97% - 100%)    Parameters below as of 09 Jul 2025 05:42  Patient On (Oxygen Delivery Method): room air        ASSESSMENT/ PLAN  [X] Patient transitioned to prn analgesics  [X] Pain management per primary service, pain service to sign off   [X]Documentation and Verification of current medications
OB Progress Note:  Delivery, POD#1    S: 35yo POD#1 s/p rLTCS . Her pain is well controlled. She is tolerating a regular diet but has not yet passed flatus. Denies N/V. Denies CP/SOB/lightheadedness/dizziness.   She is ambulating without difficulty.   Voiding spontaneously.     O:   Vital Signs Last 24 Hrs  T(C): 37.1 (2025 05:42), Max: 37.1 (2025 21:14)  T(F): 98.7 (2025 05:42), Max: 98.8 (2025 21:14)  HR: 75 (2025 05:42) (54 - 105)  BP: 116/76 (2025 05:42) (104/55 - 139/85)  BP(mean): 100 (2025 17:20) (77 - 100)  RR: 18 (2025 05:42) (18 - 18)  SpO2: 97% (2025 05:42) (97% - 100%)    Parameters below as of 2025 05:42  Patient On (Oxygen Delivery Method): room air        Labs:  Blood type: B Positive  Rubella IgG: RPR: Negative    PE:  General: NAD  Abdomen: Mildly distended, appropriately tender, incision c/d/i.  Extremities: No erythema, no pitting edema    
Pain Management Attending Addendum    SUBJECTIVE:    Therapy:	  PCA	   Epidural           s/p Spinal Opoid   x           Postpartum infusion	  Patient controlled regional anesthesia (PCRA)    prn Analgesics    OBJECTIVE: No new signs    x  Other:    Side Effects:    None	x		 Other:    Assessment of Catheter Site:		 Intact		 Other:    ASSESSMENT/PLAN  Continue current therapy    Therapy changed to:     IV PCA        Epidural      prn Analgesics  x   post partum infusion    Comments:
S: 33yo POD#2 s/p rLTCS. Pain is well controlled. She is tolerating a regular diet and passing flatus. She is voiding spontaneously, and ambulating without difficulty. Denies CP/SOB. Denies lightheadedness/dizziness. Denies N/V.    O:  Vitals:  Vital Signs Last 24 Hrs  T(C): 37.1 (10 Jul 2025 05:09), Max: 37.1 (09 Jul 2025 17:05)  T(F): 98.7 (10 Jul 2025 05:09), Max: 98.7 (09 Jul 2025 17:05)  HR: 88 (10 Jul 2025 05:09) (78 - 88)  BP: 115/78 (10 Jul 2025 05:09) (115/75 - 119/84)  BP(mean): --  RR: 18 (10 Jul 2025 05:09) (18 - 18)  SpO2: 98% (10 Jul 2025 05:09) (94% - 100%)    Parameters below as of 10 Jul 2025 05:09  Patient On (Oxygen Delivery Method): room air        MEDICATIONS  (STANDING):  acetaminophen     Tablet .. 975 milliGRAM(s) Oral <User Schedule>  diphtheria/tetanus/pertussis (acellular) Vaccine (Adacel) 0.5 milliLiter(s) IntraMuscular once  heparin   Injectable 5000 Unit(s) SubCutaneous every 12 hours  ibuprofen  Tablet. 600 milliGRAM(s) Oral every 6 hours  lactated ringers. 1000 milliLiter(s) (125 mL/Hr) IV Continuous <Continuous>  oxytocin Infusion 42 milliUNIT(s)/Min (42 mL/Hr) IV Continuous <Continuous>      MEDICATIONS  (PRN):  diphenhydrAMINE 25 milliGRAM(s) Oral every 6 hours PRN Pruritus  lanolin Ointment 1 Application(s) Topical every 6 hours PRN Sore Nipples  magnesium hydroxide Suspension 30 milliLiter(s) Oral two times a day PRN Constipation  oxyCODONE    IR 5 milliGRAM(s) Oral every 3 hours PRN Moderate to Severe Pain (4-10)  oxyCODONE    IR 5 milliGRAM(s) Oral once PRN Moderate to Severe Pain (4-10)  simethicone 80 milliGRAM(s) Chew every 4 hours PRN Gas      Labs:  Blood type: B Positive  Rubella IgG: RPR: Negative                          12.5   14.54[H] >-----------< 120[L]    ( 07-09 @ 07:04 )             36.3                  PE:  General: NAD  Abdomen: Soft, appropriately tender, incision c/d/i.  Extremities: No erythema, no pitting edema

## 2025-07-10 NOTE — DISCHARGE NOTE OB - CARE PROVIDER_API CALL
Brenden Shi  Obstetrics & Gynecology  [address]  [place] [address]  Phone: [phone]  Fax: [phone]  Follow Up Time:

## 2025-07-10 NOTE — DISCHARGE NOTE OB - FINANCIAL ASSISTANCE
[facility] provides services at a reduced cost to those who are determined to be eligible through [facility]’s financial assistance program. Information regarding [facility]’s financial assistance program can be found by going to [email or web address] or by calling [phone].

## 2025-07-10 NOTE — DISCHARGE NOTE OB - PATIENT PORTAL LINK FT
You can access the FollowMyHealth Patient Portal offered by [facility] by registering at the following website: [email or web address]. By joining [facility]’s FollowMyHealth portal, you will also be able to view your health information using other applications (apps) compatible with our system.

## 2025-07-10 NOTE — DISCHARGE NOTE OB - NS MD DC FALL RISK RISK
For information on Fall & Injury Prevention, visit: [email or web address] OR  [email or web address] OR  https://www.cdc.gov/steadi/patient.html

## 2025-07-10 NOTE — PROGRESS NOTE ADULT - ASSESSMENT
A/P: 35yo POD#2 s/p rLTCS.  Patient is stable and doing well post-operatively.    - Continue regular diet.  - Increase ambulation.  - Continue motrin, tylenol, oxycodone PRN for pain control.     Nayeli Green PGY1
A/P: 33yo POD#1 s/p rLTCS.  Patient is stable and doing well post-operatively.    - Continue regular diet.  - Increase ambulation.  - Continue motrin, tylenol, oxycodone PRN for pain control.    - F/u AM CBC    Nayeli Green PGY1

## 2025-07-10 NOTE — DISCHARGE NOTE OB - CARE PLAN
1 Principal Discharge DX:	 delivery delivered  Assessment and plan of treatment:	reg diet  nothing per vagina